# Patient Record
Sex: FEMALE | Race: WHITE | NOT HISPANIC OR LATINO | Employment: PART TIME | URBAN - METROPOLITAN AREA
[De-identification: names, ages, dates, MRNs, and addresses within clinical notes are randomized per-mention and may not be internally consistent; named-entity substitution may affect disease eponyms.]

---

## 2017-03-17 ENCOUNTER — OFFICE VISIT (OUTPATIENT)
Dept: SLEEP MEDICINE | Facility: CLINIC | Age: 57
End: 2017-03-17
Payer: COMMERCIAL

## 2017-03-17 VITALS
HEIGHT: 62 IN | WEIGHT: 200.6 LBS | OXYGEN SATURATION: 97 % | DIASTOLIC BLOOD PRESSURE: 77 MMHG | HEART RATE: 73 BPM | BODY MASS INDEX: 36.91 KG/M2 | SYSTOLIC BLOOD PRESSURE: 127 MMHG

## 2017-03-17 DIAGNOSIS — G47.00 INSOMNIA, UNSPECIFIED TYPE: ICD-10-CM

## 2017-03-17 DIAGNOSIS — E66.01 MORBID OBESITY DUE TO EXCESS CALORIES (H): ICD-10-CM

## 2017-03-17 DIAGNOSIS — R53.82 CHRONIC FATIGUE: ICD-10-CM

## 2017-03-17 DIAGNOSIS — R06.83 SNORING: Primary | ICD-10-CM

## 2017-03-17 DIAGNOSIS — I10 ESSENTIAL HYPERTENSION: ICD-10-CM

## 2017-03-17 PROCEDURE — 99244 OFF/OP CNSLTJ NEW/EST MOD 40: CPT | Performed by: FAMILY MEDICINE

## 2017-03-17 RX ORDER — EPINEPHRINE 0.3 MG/.3ML
0.3 INJECTION SUBCUTANEOUS PRN
COMMUNITY

## 2017-03-17 RX ORDER — ESTRADIOL 2 MG/1
2 TABLET ORAL DAILY
COMMUNITY

## 2017-03-17 RX ORDER — BUSPIRONE HYDROCHLORIDE 15 MG/1
15 TABLET ORAL 3 TIMES DAILY
COMMUNITY
End: 2023-09-11

## 2017-03-17 RX ORDER — HYDROCHLOROTHIAZIDE 25 MG/1
25 TABLET ORAL DAILY
COMMUNITY
End: 2023-09-11

## 2017-03-17 RX ORDER — IBUPROFEN 800 MG/1
600 TABLET, FILM COATED ORAL EVERY 8 HOURS PRN
COMMUNITY
End: 2023-09-11

## 2017-03-17 RX ORDER — LISINOPRIL 20 MG/1
10 TABLET ORAL DAILY
COMMUNITY
End: 2023-09-11

## 2017-03-17 RX ORDER — CYCLOBENZAPRINE HCL 10 MG
10 TABLET ORAL
COMMUNITY
End: 2023-09-11

## 2017-03-17 RX ORDER — ROPINIROLE 0.25 MG/1
0.25 TABLET, FILM COATED ORAL DAILY
COMMUNITY
End: 2023-09-11

## 2017-03-17 RX ORDER — HYDROXYZINE PAMOATE 25 MG/1
25 CAPSULE ORAL 2 TIMES DAILY PRN
COMMUNITY
End: 2023-08-24

## 2017-03-17 NOTE — NURSING NOTE
"Chief Complaint   Patient presents with     Sleep Problem     Consult; was referred by Dr. HORNER at [facility]. C/O edema in legs and did an echo for her heart.        Initial /77  Pulse 73  Ht 1.581 m (5' 2.25\")  Wt 91 kg (200 lb 9.6 oz)  SpO2 97%  BMI 36.4 kg/m2 Estimated body mass index is 36.4 kg/(m^2) as calculated from the following:    Height as of this encounter: 1.581 m (5' 2.25\").    Weight as of this encounter: 91 kg (200 lb 9.6 oz).  Medication Reconciliation: complete   "

## 2017-03-17 NOTE — PATIENT INSTRUCTIONS

## 2017-03-17 NOTE — PROGRESS NOTES
"  Sleep Consultation:    Date on this visit: 3/17/2017    Yumi Huitron is sent by Dr. Vernell Miguel for a sleep consultation regarding probable PETER.    Primary Physician: Iman Mai     CC: \"I was recommended to come.\"    HPI:  Yumi Huitron is a pleasant 57 yo F with history of chronic LE edema, chronic low back pain, MDD, social phobia / panic disorder, HTN, reduced LVEF who presents for evaluation in regards to sleep disordered breathing.  She is alone for this visit today.    She was recommended to be evaluated for PETER following history of snoring and work-up for chronic LE edema and recent TTE performed 3/1/2017 with reduced LVEF of 50-55%, concentric LVH, normal RV function, no valvular concerns, unable to estimate PA pressures.    She note having snoring for many years, but denies any observed apnea.  Will have AM headaches a few times a week.  Occasional choking / gasping arousals.  Notes significant daytime fatigue.  No known family history of PETER.    History of RLS, feels it is well controlled with Ropinirole 0.25mg x1-2 one hour before bed.  Feels insomnia is stable with Trazodone 50-100mg PO QHS.    On work nights, will go to bed around 8pm and take \"a couple\" of hours to fall asleep, during which she does read and watch TV in bed.  Will awaken 2-3x, unclear reasons, may take \"a couple\" of hours to fall back asleep.  Up by alarm at 5am.  Estimates total sleep time at 7 hours.  On weekends, to bed at 8pm and up naturally closer to 8am and estimates total sleep time at 9 hours.  Notes she takes a nap most days in the early afternoon for ~2 hours.    Yumi denies any sleep walking, dream enactment, sleep paralysis, cataplexy and hypnogogic/hypnopompic hallucinations.    Patient's Franklin Sleepiness score 9/24 consistent with no daytime sleepiness.      SHx:  Lives with boyfriend.  Has 3 adult children.  Works on Wed / Thurs for [employer].    Allergies:    Allergies   Allergen Reactions "     Amoxicillin Rash     And blisters on hands       Medications:    Current Outpatient Prescriptions   Medication Sig Dispense Refill     busPIRone (BUSPAR) 15 MG tablet Take 15 mg by mouth 3 times daily       cyclobenzaprine (FLEXERIL) 10 MG tablet Take 10 mg by mouth once as needed for muscle spasms       EPINEPHrine 0.3 MG/0.3ML injection Inject 0.3 mg into the muscle as needed for anaphylaxis       estradiol (ESTRACE) 2 MG tablet Take 2 mg by mouth daily       fluticasone (FLOVENT DISKUS) 50 MCG/BLIST AEPB Inhale 1 puff into the lungs every 12 hours       citalopram (CELEXA) 40 MG tablet Take 40 mg by mouth daily. 1 per day       HYDROmorphone (DILAUDID) 2 MG tablet Take 1 tablet (2 mg) by mouth every 4 hours as needed for moderate to severe pain 8 tablet 0     traZODone (DESYREL) 50 MG tablet Take 50 mg by mouth At Bedtime. 1 hs prn         Problem List:  Patient Active Problem List    Diagnosis Date Noted     S/P hysterectomy 05/21/2012     Priority: Medium     Supracervical hysterectomy and bilateral salpingectomy. Needs pap             History of hysterectomy leaving cervix intact 05/21/2012     Priority: Medium     Psoriasis 05/21/2012     Priority: Medium     Other general medical examination for administrative purposes 05/21/2012     Priority: Medium     ANAI at [place] but regular health care at [facility] - follow up on thyroid nodule there          Past Medical/Surgical History:  History reviewed. No pertinent past medical history.  Past Surgical History   Procedure Laterality Date     Gyn surgery  2003     Supracervical hysterectomy and bilateral salpingectomy.     Cholecystectomy  2006     gallbladder removed       Social History:  Social History     Social History     Marital status: Single     Spouse name: N/A     Number of children: N/A     Years of education: N/A     Occupational History     Not on file.     Social History Main Topics     Smoking status: Never Smoker     Smokeless tobacco: Never  Used     Alcohol use Yes      Comment: Moderate     Drug use: No     Sexual activity: Not Currently     Other Topics Concern     Parent/Sibling W/ Cabg, Mi Or Angioplasty Before 65f 55m? No     Social History Narrative       Family History:  Family History   Problem Relation Age of Onset     DIABETES Paternal Grandmother        Review of Systems:  A complete review of systems reviewed by me is negative with the exeption of what has been mentioned in the history of present illness.  CONSTITUTIONAL:  POSITIVE for  weight gain  EYES: NEGATIVE for changes in vision, blind spots, double vision.  ENT:  POSITIVE for  ear pain  CARDIAC:  POSITIVE for  swollen legs and swollen feet  NEUROLOGIC:  POSITIVE for  headaches and weakness or numbness in the arms or legs  DERMATOLOGIC: NEGATIVE for rashes, new moles or change in mole(s)  PULMONARY:  POSITIVE for  SOB with activity, dry cough and productive cough  GASTROINTESTINAL: NEGATIVE for nausea or vomitting, loose or watery stools, fat or grease in stools, constipation, abdominal pain, bowel movements black in color or blood noted.  GENITOURINARY: NEGATIVE for pain during urination, blood in urine, urinating more frequently than usual, irregular menstrual periods.  MUSCULOSKELETAL:  POSITIVE for  muscle pain and swollen joints  ENDOCRINE: NEGATIVE for increased thirst or urination, diabetes.  LYMPHATIC: NEGATIVE for swollen lymph nodes, lumps or bumps in the breasts or nipple discharge.    Physical Examination:  Vitals: There were no vitals taken for this visit.  BMI= There is no height or weight on file to calculate BMI.         Ulysses Total Score 3/17/2017   Total score - Ulysses 9       GENERAL APPEARANCE: healthy, alert, no distress and over weight  RESP: lungs clear to auscultation - no rales, rhonchi or wheezes  CV: regular rates and rhythm, normal S1 S2, no S3 or S4 and no murmur, click or rub  EXT: 1-2+ pitting edema to mid pre-tibial surface bilaterally  PSYCH:  mentation appears normal and affect normal/bright    Impression/Plan:    Yumi Huitron is a pleasant 55 yo F with history of chronic LE edema, chronic low back pain, MDD, social phobia / panic disorder, HTN, reduced LVEF who presents for evaluation in regards to sleep disordered breathing.    1.)  High probability of PETER.     - STOP-BANG score of 5 with snoring, daytime fatigue, HTN, BMI > 35, age > 50.  Also recent TTE with reduced LVEF, concentrilc LVH.   - Will schedule sleep study to fully evaluate for PETER and possible treatment.  Will order multiple sleep latency test the next day if no significant apnea found during sleep study to evaluate for daytime hypersomnia and narcolepsy.  Will see patient after the study.    2.)  RLS    - Well controlled per patient on Ropinirole 0.25mg x1-2 one hour before bed   - If not checked previously, recommend checking baseline ferritin.  If < 50-60 ng/mL, consider iron replacement.    3.)  Chronic insomnia   - Appears stable on Trazodone 50-100mg PO QHS   - Potential co-morbid PETER   - Also likely multi-factorial with prolonged daily naps, excessive time in bed, to bed excessively early    Plan as given to patient as above.    I have spent 60 minutes with this patient today in which greater than 50% of this time was spent in the counseling / coordination of care regarding PETER, insomnia, RLS.    Polysomnography reviewed.  Obstructive sleep apnea reviewed.  Complications of untreated sleep apnea were reviewed.    Richard Martinez MD    CC: Dr. Vernell Miguel

## 2017-03-17 NOTE — MR AVS SNAPSHOT
After Visit Summary   3/17/2017    Yumi Huitron    MRN: [id number]           Patient Information     Date Of Birth          1960        Visit Information        Provider Department      3/17/2017 1:00 PM Richard Martinez MD [facility]        Today's Diagnoses     Snoring    -  1    Morbid obesity due to excess calories (H)        Essential hypertension        Chronic fatigue        Insomnia, unspecified type          Care Instructions      Your BMI is Body mass index is 36.4 kg/(m^2).  Weight management is a personal decision.  If you are interested in exploring weight loss strategies, the following discussion covers the approaches that may be successful. Body mass index (BMI) is one way to tell whether you are at a healthy weight, overweight, or obese. It measures your weight in relation to your height.  A BMI of 18.5 to 24.9 is in the healthy range. A person with a BMI of 25 to 29.9 is considered overweight, and someone with a BMI of 30 or greater is considered obese. More than two-thirds of American adults are considered overweight or obese.  Being overweight or obese increases the risk for further weight gain. Excess weight may lead to heart disease and diabetes.  Creating and following plans for healthy eating and physical activity may help you improve your health.  Weight control is part of healthy lifestyle and includes exercise, emotional health, and healthy eating habits. Careful eating habits lifelong are the mainstay of weight control. Though there are significant health benefits from weight loss, long-term weight loss with diet alone may be very difficult to achieve- studies show long-term success with dietary management in less than 10% of people. Attaining a healthy weight may be especially difficult to achieve in those with severe obesity. In some cases, medications, devices and surgical management might be considered.  What can you do?  If you are  overweight or obese and are interested in methods for weight loss, you should discuss this with your provider.     Consider reducing daily calorie intake by 500 calories.     Keep a food journal.     Avoiding skipping meals, consider cutting portions instead.    Diet combined with exercise helps maintain muscle while optimizing fat loss. Strength training is particularly important for building and maintaining muscle mass. Exercise helps reduce stress, increase energy, and improves fitness. Increasing exercise without diet control, however, may not burn enough calories to loose weight.       Start walking three days a week 10-20 minutes at a time    Work towards walking thirty minutes five days a week     Eventually, increase the speed of your walking for 1-2 minutes at time    In addition, we recommend that you review healthy lifestyles and methods for weight loss available through the National Institutes of Health patient information sites:  http://win.niddk.nih.gov/publications/index.htm    And look into health and wellness programs that may be available through your health insurance provider, employer, local community center, or justin club.    Weight management plan: Patient was referred to their PCP to discuss a diet and exercise plan.           Follow-ups after your visit        Follow-up notes from your care team     Return if symptoms worsen or fail to improve.      Your next 10 appointments already scheduled     Mar 27, 2017  8:00 PM CDT   PSG Split with SLEEP LAB, BED FOUR   [facility]    [address]  [facility] [address]   [phone]            Mar 28, 2017  8:00 AM CDT   Return Sleep Patient with Richard Martinez MD   [facility] ([facility]    [address]  [facility] [address]   [phone]              Future tests that were ordered for you today     Open Future Orders        Priority Expected  "Expires Ordered    Comprehensive Sleep Study Routine  2017 3/17/2017            Who to contact     If you have questions or need follow up information about today's clinic visit or your schedule please contact [facility] directly at [phone].  Normal or non-critical lab and imaging results will be communicated to you by MyChart, letter or phone within 4 business days after the clinic has received the results. If you do not hear from us within 7 days, please contact the clinic through MyChart or phone. If you have a critical or abnormal lab result, we will notify you by phone as soon as possible.  Submit refill requests through [employer] or call your pharmacy and they will forward the refill request to us. Please allow 3 business days for your refill to be completed.          Additional Information About Your Visit        MyChart Information     [employer] lets you send messages to your doctor, view your test results, renew your prescriptions, schedule appointments and more. To sign up, go to [email or web address] . Click on \"Log in\" on the left side of the screen, which will take you to the Welcome page. Then click on \"Sign up Now\" on the right side of the page.     You will be asked to enter the access code listed below, as well as some personal information. Please follow the directions to create your username and password.     Your access code is: [email or web address]  Expires: 6/15/2017  1:53 PM     Your access code will  in 90 days. If you need help or a new code, please call your [facility] or [phone].        Care EveryWhere ID     This is your Care EveryWhere ID. This could be used by other organizations to access your [place] medical records  [id number]        Your Vitals Were     Pulse Height Pulse Oximetry BMI (Body Mass Index)          73 1.581 m (5' 2.25\") 97% 36.4 kg/m2         Blood Pressure from Last 3 Encounters:   17 127/77   01/28/15 115/79   14 128/61 "    Weight from Last 3 Encounters:   03/17/17 91 kg (200 lb 9.6 oz)   06/04/12 89.8 kg (198 lb)   05/21/12 91.4 kg (201 lb 9.6 oz)                 Today's Medication Changes          These changes are accurate as of: 3/17/17  1:53 PM.  If you have any questions, ask your nurse or doctor.               These medicines have changed or have updated prescriptions.        Dose/Directions    busPIRone 15 MG tablet   Commonly known as:  BUSPAR   This may have changed:  Another medication with the same name was removed. Continue taking this medication, and follow the directions you see here.   Changed by:  Richard Martinez MD        Dose:  15 mg   Take 15 mg by mouth 3 times daily   Refills:  0                Primary Care Provider Office Phone # Fax #    Iman Mai [phone]       [facility] [address] [facility] [address]        Thank you!     Thank you for choosing [facility]  for your care. Our goal is always to provide you with excellent care. Hearing back from our patients is one way we can continue to improve our services. Please take a few minutes to complete the written survey that you may receive in the mail after your visit with us. Thank you!             Your Updated Medication List - Protect others around you: Learn how to safely use, store and throw away your medicines at www.disposemymeds.org.          This list is accurate as of: 3/17/17  1:53 PM.  Always use your most recent med list.                   Brand Name Dispense Instructions for use    busPIRone 15 MG tablet    BUSPAR     Take 15 mg by mouth 3 times daily       celeXA 40 MG tablet   Generic drug:  citalopram      Take 40 mg by mouth daily. 1 per day       cyclobenzaprine 10 MG tablet    FLEXERIL     Take 10 mg by mouth once as needed for muscle spasms       EPINEPHrine 0.3 MG/0.3ML injection      Inject 0.3 mg into the muscle as needed for anaphylaxis       estradiol 2 MG tablet     ESTRACE     Take 2 mg by mouth daily       fluticasone 50 MCG/BLIST Aepb    FLOVENT DISKUS     Inhale 1 puff into the lungs every 12 hours       hydrochlorothiazide 25 MG tablet    HYDRODIURIL     Take 25 mg by mouth daily       HYDROmorphone 2 MG tablet    DILAUDID    8 tablet    Take 1 tablet (2 mg) by mouth every 4 hours as needed for moderate to severe pain       hydrOXYzine 25 MG capsule    VISTARIL     Take 25 mg by mouth 2 times daily as needed for itching       ibuprofen 800 MG tablet    ADVIL/MOTRIN     Take 800 mg by mouth every 8 hours as needed for moderate pain       lisinopril 20 MG tablet    PRINIVIL/ZESTRIL     Take 20 mg by mouth daily       rOPINIRole 0.25 MG tablet    REQUIP     Take 0.25 mg by mouth daily       traZODone 50 MG tablet    DESYREL     Take 50 mg by mouth At Bedtime. 1 hs prn

## 2017-03-27 ENCOUNTER — THERAPY VISIT (OUTPATIENT)
Dept: SLEEP MEDICINE | Facility: CLINIC | Age: 57
End: 2017-03-27
Payer: COMMERCIAL

## 2017-03-27 DIAGNOSIS — E66.01 MORBID OBESITY DUE TO EXCESS CALORIES (H): ICD-10-CM

## 2017-03-27 DIAGNOSIS — I10 ESSENTIAL HYPERTENSION: ICD-10-CM

## 2017-03-27 DIAGNOSIS — R06.83 SNORING: ICD-10-CM

## 2017-03-27 DIAGNOSIS — G47.00 INSOMNIA, UNSPECIFIED TYPE: ICD-10-CM

## 2017-03-27 DIAGNOSIS — R53.82 CHRONIC FATIGUE: ICD-10-CM

## 2017-03-27 PROCEDURE — 95810 POLYSOM 6/> YRS 4/> PARAM: CPT | Performed by: FAMILY MEDICINE

## 2017-03-27 NOTE — MR AVS SNAPSHOT
After Visit Summary   3/27/2017    Yumi Huitron    MRN: [id number]           Patient Information     Date Of Birth          1960        Visit Information        Provider Department      3/27/2017 8:00 PM SLEEP LAB, BED FOUR [facility]        Today's Diagnoses     Insomnia, unspecified type        Chronic fatigue        Essential hypertension        Morbid obesity due to excess calories (H)        Snoring          Care Instructions    Pt arrived at [facility] Sleep lab for sleep study.  Diagnostic PSG completed per provider order.  Patient did not meet criteria for PAP therapy.  MEDICATIONS: busPIRone (BUSPAR) 15 MG tablet      cyclobenzaprine (FLEXERIL) 10 MG tablet     EPINEPHrine 0.3 MG/0.3ML injection    estradiol (ESTRACE) 2 MG tablet    fluticasone (FLOVENT DISKUS) 50 MCG/BLIST AEPB    hydrochlorothiazide (HYDRODIURIL) 25 MG tablet     hydrOXYzine (VISTARIL) 25 MG capsule     ibuprofen (ADVIL/MOTRIN) 800 MG tablet    lisinopril (PRINIVIL/ZESTRIL) 20 MG tablet    rOPINIRole (REQUIP) 0.25 MG tablet     HYDROmorphone (DILAUDID) 2 MG tablet     citalopram (CELEXA) 40 MG tablet     traZODone (DESYREL) 50 MG tablet       STUDY TYPE: NPSG  SLEEP AID: Trazadone 50mg 2 hours prior to lights out  PAP ACCLIMATION: good, trialed with the wisp large, airfit f-20 small and the dreamwear small.  RESPIRATORY EVENTS: obstructive hypopneas (rem supine) and post arousal centrals resulting in an AHI <10  ECG Baseline Heart Rate= 65 BPM/check #780 questionable bigemeny  SPO2-Baseline=96% with Harvey=84%   SNORING: no  TCO2 MONITORING: n/a          Follow-ups after your visit        Who to contact     If you have questions or need follow up information about today's clinic visit or your schedule please contact [facility] directly at [phone].  Normal or non-critical lab and imaging results will be communicated to you by MyChart, letter or phone within 4 business days after  "the clinic has received the results. If you do not hear from us within 7 days, please contact the clinic through [employer] or phone. If you have a critical or abnormal lab result, we will notify you by phone as soon as possible.  Submit refill requests through [employer] or call your pharmacy and they will forward the refill request to us. Please allow 3 business days for your refill to be completed.          Additional Information About Your Visit        [employer] Information     [employer] lets you send messages to your doctor, view your test results, renew your prescriptions, schedule appointments and more. To sign up, go to [email or web address] . Click on \"Log in\" on the left side of the screen, which will take you to the Welcome page. Then click on \"Sign up Now\" on the right side of the page.     You will be asked to enter the access code listed below, as well as some personal information. Please follow the directions to create your username and password.     Your access code is: [email or web address]  Expires: 6/15/2017  1:53 PM     Your access code will  in 90 days. If you need help or a new code, please call your [place] clinic or [phone].        Care EveryWhere ID     This is your Care EveryWhere ID. This could be used by other organizations to access your [place] medical records  [id number]         Blood Pressure from Last 3 Encounters:   17 132/71   17 127/77   01/28/15 115/79    Weight from Last 3 Encounters:   17 91 kg (200 lb 9.6 oz)   17 91 kg (200 lb 9.6 oz)   12 89.8 kg (198 lb)              We Performed the Following     Comprehensive Sleep Study        Primary Care Provider Office Phone # Fax #    Iman Mai [phone]       [facility] [address] [facility] [address]        Thank you!     Thank you for choosing [facility]  for your care. Our goal is always to provide you with excellent care. Hearing back from " our patients is one way we can continue to improve our services. Please take a few minutes to complete the written survey that you may receive in the mail after your visit with us. Thank you!             Your Updated Medication List - Protect others around you: Learn how to safely use, store and throw away your medicines at www.disposemymeds.org.          This list is accurate as of: 3/27/17 11:59 PM.  Always use your most recent med list.                   Brand Name Dispense Instructions for use    busPIRone 15 MG tablet    BUSPAR     Take 15 mg by mouth 3 times daily       celeXA 40 MG tablet   Generic drug:  citalopram      Take 40 mg by mouth daily. 1 per day       cyclobenzaprine 10 MG tablet    FLEXERIL     Take 10 mg by mouth once as needed for muscle spasms       EPINEPHrine 0.3 MG/0.3ML injection      Inject 0.3 mg into the muscle as needed for anaphylaxis       estradiol 2 MG tablet    ESTRACE     Take 2 mg by mouth daily       fluticasone 50 MCG/BLIST Aepb    FLOVENT DISKUS     Inhale 1 puff into the lungs every 12 hours       hydrochlorothiazide 25 MG tablet    HYDRODIURIL     Take 25 mg by mouth daily       HYDROmorphone 2 MG tablet    DILAUDID    8 tablet    Take 1 tablet (2 mg) by mouth every 4 hours as needed for moderate to severe pain       hydrOXYzine 25 MG capsule    VISTARIL     Take 25 mg by mouth 2 times daily as needed for itching       ibuprofen 800 MG tablet    ADVIL/MOTRIN     Take 800 mg by mouth every 8 hours as needed for moderate pain       lisinopril 20 MG tablet    PRINIVIL/ZESTRIL     Take 20 mg by mouth daily       rOPINIRole 0.25 MG tablet    REQUIP     Take 0.25 mg by mouth daily       traZODone 50 MG tablet    DESYREL     Take 50 mg by mouth At Bedtime. 1 hs prn

## 2017-03-28 ENCOUNTER — OFFICE VISIT (OUTPATIENT)
Dept: SLEEP MEDICINE | Facility: CLINIC | Age: 57
End: 2017-03-28
Payer: COMMERCIAL

## 2017-03-28 VITALS
OXYGEN SATURATION: 94 % | HEIGHT: 62 IN | SYSTOLIC BLOOD PRESSURE: 132 MMHG | BODY MASS INDEX: 36.91 KG/M2 | WEIGHT: 200.6 LBS | DIASTOLIC BLOOD PRESSURE: 71 MMHG | HEART RATE: 69 BPM

## 2017-03-28 DIAGNOSIS — G47.33 OSA (OBSTRUCTIVE SLEEP APNEA): ICD-10-CM

## 2017-03-28 DIAGNOSIS — G47.33 OSA (OBSTRUCTIVE SLEEP APNEA): Primary | ICD-10-CM

## 2017-03-28 PROCEDURE — 99214 OFFICE O/P EST MOD 30 MIN: CPT | Performed by: FAMILY MEDICINE

## 2017-03-28 NOTE — PATIENT INSTRUCTIONS
Pt arrived at [facility] Sleep lab for sleep study.  Diagnostic PSG completed per provider order.  Patient did not meet criteria for PAP therapy.  MEDICATIONS: busPIRone (BUSPAR) 15 MG tablet      cyclobenzaprine (FLEXERIL) 10 MG tablet     EPINEPHrine 0.3 MG/0.3ML injection    estradiol (ESTRACE) 2 MG tablet    fluticasone (FLOVENT DISKUS) 50 MCG/BLIST AEPB    hydrochlorothiazide (HYDRODIURIL) 25 MG tablet     hydrOXYzine (VISTARIL) 25 MG capsule     ibuprofen (ADVIL/MOTRIN) 800 MG tablet    lisinopril (PRINIVIL/ZESTRIL) 20 MG tablet    rOPINIRole (REQUIP) 0.25 MG tablet     HYDROmorphone (DILAUDID) 2 MG tablet     citalopram (CELEXA) 40 MG tablet     traZODone (DESYREL) 50 MG tablet       STUDY TYPE: NPSG  SLEEP AID: Trazadone 50mg 2 hours prior to lights out  PAP ACCLIMATION: good, trialed with the wisp large, airfit f-20 small and the dreamwear small.  RESPIRATORY EVENTS: obstructive hypopneas (rem supine) and post arousal centrals resulting in an AHI <10  ECG Baseline Heart Rate= 65 BPM/check #780 questionable bigemeny  SPO2-Baseline=96% with Harvey=84%   SNORING: no  TCO2 MONITORING: n/a

## 2017-03-28 NOTE — NURSING NOTE
"Chief Complaint   Patient presents with     Sleep Problem     Discuss sleep study       Initial /71  Pulse 69  Ht 1.581 m (5' 2.24\")  Wt 91 kg (200 lb 9.6 oz)  SpO2 94%  BMI 36.4 kg/m2 Estimated body mass index is 36.4 kg/(m^2) as calculated from the following:    Height as of this encounter: 1.581 m (5' 2.24\").    Weight as of this encounter: 91 kg (200 lb 9.6 oz).  Medication Reconciliation: complete  "

## 2017-03-28 NOTE — MR AVS SNAPSHOT
After Visit Summary   3/28/2017    Yumi Huitron    MRN: [id number]           Patient Information     Date Of Birth          1960        Visit Information        Provider Department      3/28/2017 8:00 AM Richard Martinez MD [facility]        Today's Diagnoses     PETER (obstructive sleep apnea)          Care Instructions      Your BMI is Body mass index is 36.4 kg/(m^2).  Weight management is a personal decision.  If you are interested in exploring weight loss strategies, the following discussion covers the approaches that may be successful. Body mass index (BMI) is one way to tell whether you are at a healthy weight, overweight, or obese. It measures your weight in relation to your height.  A BMI of 18.5 to 24.9 is in the healthy range. A person with a BMI of 25 to 29.9 is considered overweight, and someone with a BMI of 30 or greater is considered obese. More than two-thirds of American adults are considered overweight or obese.  Being overweight or obese increases the risk for further weight gain. Excess weight may lead to heart disease and diabetes.  Creating and following plans for healthy eating and physical activity may help you improve your health.  Weight control is part of healthy lifestyle and includes exercise, emotional health, and healthy eating habits. Careful eating habits lifelong are the mainstay of weight control. Though there are significant health benefits from weight loss, long-term weight loss with diet alone may be very difficult to achieve- studies show long-term success with dietary management in less than 10% of people. Attaining a healthy weight may be especially difficult to achieve in those with severe obesity. In some cases, medications, devices and surgical management might be considered.  What can you do?  If you are overweight or obese and are interested in methods for weight loss, you should discuss this with your provider.     Consider  reducing daily calorie intake by 500 calories.     Keep a food journal.     Avoiding skipping meals, consider cutting portions instead.    Diet combined with exercise helps maintain muscle while optimizing fat loss. Strength training is particularly important for building and maintaining muscle mass. Exercise helps reduce stress, increase energy, and improves fitness. Increasing exercise without diet control, however, may not burn enough calories to loose weight.       Start walking three days a week 10-20 minutes at a time    Work towards walking thirty minutes five days a week     Eventually, increase the speed of your walking for 1-2 minutes at time    In addition, we recommend that you review healthy lifestyles and methods for weight loss available through the National Institutes of Health patient information sites:  http://win.niddk.nih.gov/publications/index.htm    And look into health and wellness programs that may be available through your health insurance provider, employer, local community center, or justin club.    Weight management plan: Patient was referred to their PCP to discuss a diet and exercise plan.          Follow-ups after your visit        Who to contact     If you have questions or need follow up information about today's clinic visit or your schedule please contact [facility] directly at [phone].  Normal or non-critical lab and imaging results will be communicated to you by MyChart, letter or phone within 4 business days after the clinic has received the results. If you do not hear from us within 7 days, please contact the clinic through [employer] or phone. If you have a critical or abnormal lab result, we will notify you by phone as soon as possible.  Submit refill requests through [employer] or call your pharmacy and they will forward the refill request to us. Please allow 3 business days for your refill to be completed.          Additional Information About Your Visit    "     MyChart Information     [facility] lets you send messages to your doctor, view your test results, renew your prescriptions, schedule appointments and more. To sign up, go to [email or web address] . Click on \"Log in\" on the left side of the screen, which will take you to the Welcome page. Then click on \"Sign up Now\" on the right side of the page.     You will be asked to enter the access code listed below, as well as some personal information. Please follow the directions to create your username and password.     Your access code is: [email or web address]  Expires: 6/15/2017  1:53 PM     Your access code will  in 90 days. If you need help or a new code, please call your [facility] clinic or [phone].        Care EveryWhere ID     This is your Care EveryWhere ID. This could be used by other organizations to access your [facility] medical records  [id number]        Your Vitals Were     Pulse Height Pulse Oximetry BMI (Body Mass Index)          69 1.581 m (5' 2.24\") 94% 36.4 kg/m2         Blood Pressure from Last 3 Encounters:   17 132/71   17 127/77   01/28/15 115/79    Weight from Last 3 Encounters:   17 91 kg (200 lb 9.6 oz)   17 91 kg (200 lb 9.6 oz)   12 89.8 kg (198 lb)              Today, you had the following     No orders found for display       Primary Care Provider Office Phone # Fax #    Iman Mai [phone]       [facility] [address] [facility] [facility] [address]        Thank you!     Thank you for choosing [facility]  for your care. Our goal is always to provide you with excellent care. Hearing back from our patients is one way we can continue to improve our services. Please take a few minutes to complete the written survey that you may receive in the mail after your visit with us. Thank you!             Your Updated Medication List - Protect others around you: Learn how to safely use, store and throw away your medicines " at www.disposemymeds.org.          This list is accurate as of: 3/28/17  8:51 AM.  Always use your most recent med list.                   Brand Name Dispense Instructions for use    busPIRone 15 MG tablet    BUSPAR     Take 15 mg by mouth 3 times daily       celeXA 40 MG tablet   Generic drug:  citalopram      Take 40 mg by mouth daily. 1 per day       cyclobenzaprine 10 MG tablet    FLEXERIL     Take 10 mg by mouth once as needed for muscle spasms       EPINEPHrine 0.3 MG/0.3ML injection      Inject 0.3 mg into the muscle as needed for anaphylaxis       estradiol 2 MG tablet    ESTRACE     Take 2 mg by mouth daily       fluticasone 50 MCG/BLIST Aepb    FLOVENT DISKUS     Inhale 1 puff into the lungs every 12 hours       hydrochlorothiazide 25 MG tablet    HYDRODIURIL     Take 25 mg by mouth daily       HYDROmorphone 2 MG tablet    DILAUDID    8 tablet    Take 1 tablet (2 mg) by mouth every 4 hours as needed for moderate to severe pain       hydrOXYzine 25 MG capsule    VISTARIL     Take 25 mg by mouth 2 times daily as needed for itching       ibuprofen 800 MG tablet    ADVIL/MOTRIN     Take 800 mg by mouth every 8 hours as needed for moderate pain       lisinopril 20 MG tablet    PRINIVIL/ZESTRIL     Take 20 mg by mouth daily       rOPINIRole 0.25 MG tablet    REQUIP     Take 0.25 mg by mouth daily       traZODone 50 MG tablet    DESYREL     Take 50 mg by mouth At Bedtime. 1 hs prn

## 2017-03-28 NOTE — PROCEDURES
" SLEEP STUDY INTERPRETATION  POLYSOMNOGRAPHY REPORT      Patient: CINTHYA GARCIA  Date of Birth: [birth year]  Study Date: 3/27/17  MRN: [id number]  Referring Provider: Vernell Pablo MD  Ordering Provider: Richard Martinez MD    Indications for Polysomnography: The patient is a 56 y old Female who is 5' 2\" and weighs 200.0 lbs.  Her BMI is 36.3, [place] sleepiness scale 9.0 and neck size is 0.0.  Relevant medical history includes chronic LE edema, chronic low back pain, MDD, social phobia / panic disorder, HTN, reduced LVEF. A diagnostic polysomnogram was performed to evaluate for snoring, insomnia, chronic LE edema, mild reduction in LVEF on TTE performed 3/1/2017.    Polysomnogram Data:  A full night polysomnogram recorded the standard physiologic parameters including EEG, EOG, EMG, ECG, nasal and oral airflow.  Respiratory parameters of chest and abdominal movements were recorded with respiratory inductance plethysmography.  Oxygen saturation was recorded by pulse oximetry.      Sleep Architecture: Short sleep latency, delayed REM latency, excellent sleep efficiency  The total recording time of the polysomnogram was 484.7 minutes.  The total sleep time was 471.0 minutes.  Sleep latency was decreased at 3.4 minutes with Trazodone 50mg two hours prior to lights out.  REM latency was 349.5 minutes.  Arousal index was increased at 37.1 arousals per hour.  Sleep efficiency was excellent at 97.2%.  Wake after sleep onset was 9.5 minutes.  The patient spent 7.1% of total sleep time in Stage N1, 76.9% in Stage N2, 7.6% in Stages N3, and 8.4% in REM.  Time in REM supine was 39.5 minutes.    Respiration: Mild PETER without sleep-associated hypoxemia.    Events - The polysomnogram revealed a presence of - obstructive, 13 central, and - mixed apneas resulting in an apnea index of 1.7 events per hour.  There were 34 hypopneas resulting in a hypopnea index of 4.3 events per hour.  The combined apnea/hypopnea index was 6.0 " events per hour.  The REM AHI was 28.9 events per hour.  The supine AHI was 6.0 events per hour.  The RERA index was 3.7 events per hour.   The RDI was 9.7 events per hour.    Snoring - was reported as minimal.    Respiratory rate and pattern - was notable for normal respiratory rate and pattern.    Sustained Sleep Associated Hypoventilation - Transcutaneous carbon dioxide monitoring was not used, however significant hypoventilation was not suggested by oximetry.    Sleep Associated Hypoxemia - (Greater than 5 minutes O2 sat below 89%) was not present.  Baseline oxygen saturation was 93.6%. Lowest oxygen saturation was 83.8%.  Time spent less than or equal to 88% was 0.6 minutes.  Time spent less than or equal to 89% was 0.7 minutes.  9.7 3.7 6.0     Movement Activity: Nothing of note    Periodic Limb Activity - There were - PLMs during the entire study. The PLM index was - movements per hour.  The PLM Arousal Index was - per hour.    REM EMG Activity - Excessive transient / sustained muscle activity was not present.    Nocturnal Behavior - Abnormal sleep related behaviors were not noted during / arising out of NREM / REM sleep.    Bruxism - None apparent.    Cardiac Summary:   The average pulse rate was 65.6 bpm.  The minimum pulse rate was 45.8 bpm while the maximum pulse rate was 86.2 bpm. The rhythm is normal sinus. Arrhythmias were not noted.    Assessment:     Sleep architecture noted for short sleep latency (sleep onset ~10pm), delayed REM latency, excellent sleep efficiency    Mild PETER without sleep-associated hypoxemia    Recommendations:    Based on the presence of mild obstructive sleep apnea and excessive daytime sleepiness, treatment could be empirically initiated with Auto-titrating PAP therapy with a range of 5 - 15 cmH2O. Recommend clinical follow up with sleep management team.    Patient may be a candidate for dental appliance through referral to Sleep Dentistry for the treatment of obstructive  "sleep apnea and/or socially disruptive snoring.    Weight management (if BMI > 30).    Diagnostic Codes:     Obstructive Sleep Apnea G47.33                     Range(%) Time in range (min) Time in range (%) Time in or below range (min) Time in or below range (%)   0.0 - 89.0 0.7 0.2% 0.7 0.2%   0.0 - 88.0 0.6 0.1% 0.6 0.1%      6.0 6.0 28.9         PSG REPORT      Patient Name: CINTHYA GARCIA Study Date: 3/27/17   Date of Birth: [birth year] Study Type: NPSG   Age:  56 y MRN: [id number]   Sex: Female Interpreting Provider: jake   BMI:  36.3 Ordering Provider: Richard Martinez MD   Height: 5' 2\" Referring Provider: Vernell Pablo MD   Weight: 200.0 lbs Recording Tech: Vladislav Weinstein   [place]: 9.0 Scoring Tech: LUKASZ Hopper   Neck Size (cm): 0.0 Hypopnea Scoring 4%       Study Overview    First Lights Off: 10:06:35 PM  COUNT INDEX   Last Lights On: 06:11:17 AM Awakenings: 18 2.3   Time in Bed: 484.7 min Arousals: 291 37.1   Total Sleep Time: 471.0 min Apneas & Hypopneas: 47 6.0   Sleep Efficiency: 97.2% Limb Movements: - -   Sleep Period Time: 481.0 min RDI:  9.7   Sleep Latency: 3.4 min   3.7   Wake after Sleep Onset: 9.5 min      REM Latency from Sleep Onset: 349.5 min Average Oxygen Saturation:  93.6%        Sleep Architecture                   % of Time in Bed  Stages TIME (mins) % SLEEP TIME   WAKE 14.0    Stage N1 33.5 7.1%   Stage N2 362.0 76.9%   Stage N3 36.0 7.6%   REM 39.5 8.4%     Arousal Summary     NREM REM Total Sleep Time Index   Apnea & Hypopnea Arousals 4 - 4 0.5   PLM Arousals - - - -   Isolated Limb Movement Arousals - - - -   Spontaneous Arousals 242 3 245 32.9   RERAs  25 4 29 3.7   Total 271 7 278 37.1   Arousal Index 37.7 10.6 35.4        Respiratory Summary     By Sleep Stage By Body Position TOTAL    NREM REM REM SUPINE SUPINE NON-SUPINE    Sleep Time (min) 431.5 39.5 39.5 471.0 - 471.0   Mixed Apnea Index - - - - - -   Obstructive Apnea - - - - - -    Mixed Apnea - - - - - - "   Central Apnea 9 4 4 13 - 13   Total Apneas 9 4 4 13 - 13   Total Apnea Index 1.3 6.1 6.1 1.7 - 1.7   Central Apnea Index 1.3 6.1 6.1 1.7 - 1.7   Obstructive Hypopnea  19 15 15 34 - 34   Central Hypopnea - - - - - -   Total Hypopneas 19 15 15 34 - 34   Total Hypopnea Index 2.6 22.8 22.8 4.3 - 4.3   Central Hypopnea Index - - - - - -   All Apneas & Hypopneas 28 19 19 47 - 47   CAHI 1.3 6.1 6.1 1.7 - 1.7   CAHI% 33% 21% 21% 28% - 28%   AHI 3.9 28.9 28.9 6.0 - 6.0             RERAs 25 4 4 29 - 29   RERA Index 3.5 6.1 6.1 3.7 - 3.7   RDI 7.4 35.0 35.0 9.7 - 9.7     Oxygen Saturation Summary     WAKE NREM REM   Average OSat (%) 93.6% 93.6% 93.6%   Minimum OSat (%) 85.2% 87.4% 83.8%   Maximum OSat (%) 96.9% 96.5% 95.6%   Oxygen Saturation Durations    Range(%) Time in range (min) Time in range (%)   0.0 - 88.0 0.6 0.1%   0.0 - 89.0 0.7 0.2%     # of Desaturations 27     Minimum Oxygen Saturation During Desaturation 83.8%?       Limb Movement Summary     COUNT INDEX   Isolated Limb Movements - -   Periodic Limb Movements (PLMs) - -   PLM Arousals - -   Total Limb Movements - -     Cardiac Summary     WAKE NREM REM TOTAL    Average Pulse Rate (BPM) 69.8 65.6 64.6 65.6   Minimum Pulse Rate (BPM) 55.4 45.8 50.1 45.8   Maximum Pulse Rate (BPM) 86.2 86.0 84.1 86.2     Cardiac Comments    Not a Normal Sinus Rhythm    Recording Tech Comments    MEDICATIONS: busPIRone (BUSPAR) 15 MG tablet      cyclobenzaprine (FLEXERIL) 10 MG tablet     EPINEPHrine 0.3 MG/0.3ML injection    estradiol (ESTRACE) 2 MG tablet    fluticasone (FLOVENT DISKUS) 50 MCG/BLIST AEPB    hydrochlorothiazide (HYDRODIURIL) 25 MG tablet     hydrOXYzine (VISTARIL) 25 MG capsule     ibuprofen (ADVIL/MOTRIN) 800 MG tablet    lisinopril (PRINIVIL/ZESTRIL) 20 MG tablet    rOPINIRole (REQUIP) 0.25 MG tablet     HYDROmorphone (DILAUDID) 2 MG tablet     citalopram (CELEXA) 40 MG tablet     traZODone (DESYREL) 50 MG tablet       STUDY TYPE: NPSG  SLEEP AID: Trazadone  50mg 2 hours prior to lights out  PAP ACCLIMATION: good, trialed with the wisp large, airfit f-20 small and the dreamwear small.  RESPIRATORY EVENTS: obstructive hypopneas (rem supine) and post arousal centrals resulting in an AHI <10  ECG Baseline Heart Rate= 65 BPM/check #780 questionable bigemeny  SPO2-Baseline=96% with Harvey=84%   SNORING: no  TCO2 MONITORING: n/a      Scoring Tech Comments    This was an all-night diagnostic study.  Sleep included all stages supine only.  There were a few obstructive hypopneas and RERAs with O2 saturations mostly stable in the mid 90%s.  No PLMs.  Normal sinus rhythm with occasional PACs.

## 2017-03-28 NOTE — PROGRESS NOTES
Sleep Study Follow-Up Visit:    Date on this visit: 3/28/2017    Yumi Huitron comes in today for follow-up of her sleep study done on 3/27/2017 at the [facility] [place] for snoring, insomnia, chronic LE edema, mild reduction in LVEF on TTE performed 3/1/2017.    Patient being seen morning after study, so some detailed information has not been generated by computer system, but I reviewed the study in its entirety.    AHI ~10, maria teresa SpO2 84%, time of SpO2 <= 88% of 0.6 minutes.  Multiple supine REM periods observed.  Noted that sleep onset was fast around 10:05pm, no frequent or prolonged awakenings, and awoke naturally at ~6:10am.  She notes feeling like she slept very well.  Noted delayed REM latency.    Past medical/surgical history, family history, social history, medications and allergies were reviewed.      Problem List:  Patient Active Problem List    Diagnosis Date Noted     S/P hysterectomy 05/21/2012     Priority: Medium     Supracervical hysterectomy and bilateral salpingectomy. Needs pap             History of hysterectomy leaving cervix intact 05/21/2012     Priority: Medium     Psoriasis 05/21/2012     Priority: Medium     Other general medical examination for administrative purposes 05/21/2012     Priority: Medium     [facility] [place] but [facility] care at [place] - follow up on thyroid nodule there          Impression/Plan:    1.)  Mild PETER without sleep-associated hypoxemia   - Not felt to have a significant increase in cardiovascular risk factors and I also feel it is unlikely to be a primary component of her chronic LE edema.   - Given improvement in insomnia with behavior modification, would not see strong indication for treatment at this time    2.)  RLS  - Well controlled per patient on Ropinirole 0.25mg x1-2 one hour before bed  - If not checked previously, recommend checking baseline ferritin. If < 50-60 ng/mL, consider iron replacement.    3.)  Chronic insomnia   - Stable  on Trazodone 50-100mg PO QHS   - Noted improved in both sleep latency and maintenance with delay in bed time and minimizing daytime napping   - Reviewed sleep hygiene / stimulus control / sleep restriction    She will follow up with me as needed.     Twenty-five minutes spent with patient, all of which were spent face-to-face counseling, consulting, coordinating plan of care.      Richard Martinez MD    CC: Iman Mai

## 2017-03-28 NOTE — PATIENT INSTRUCTIONS

## 2017-06-05 ENCOUNTER — TELEPHONE (OUTPATIENT)
Dept: SLEEP MEDICINE | Facility: CLINIC | Age: 57
End: 2017-06-05

## 2017-07-08 ENCOUNTER — HEALTH MAINTENANCE LETTER (OUTPATIENT)
Age: 57
End: 2017-07-08

## 2018-07-15 ENCOUNTER — HEALTH MAINTENANCE LETTER (OUTPATIENT)
Age: 58
End: 2018-07-15

## 2020-11-02 ENCOUNTER — RECORDS - HEALTHEAST (OUTPATIENT)
Dept: LAB | Facility: CLINIC | Age: 60
End: 2020-11-02

## 2020-11-02 LAB
SARS-COV-2 PCR COMMENT: NORMAL
SARS-COV-2 RNA SPEC QL NAA+PROBE: NEGATIVE
SARS-COV-2 VIRUS SPECIMEN SOURCE: NORMAL

## 2021-01-15 ENCOUNTER — HOSPITAL ENCOUNTER (EMERGENCY)
Facility: CLINIC | Age: 61
Discharge: HOME | End: 2021-01-15
Attending: FAMILY MEDICINE | Admitting: FAMILY MEDICINE
Payer: COMMERCIAL

## 2021-01-15 ENCOUNTER — APPOINTMENT (OUTPATIENT)
Dept: GENERAL RADIOLOGY | Facility: CLINIC | Age: 61
End: 2021-01-15
Attending: FAMILY MEDICINE
Payer: COMMERCIAL

## 2021-01-15 VITALS
RESPIRATION RATE: 18 BRPM | OXYGEN SATURATION: 96 % | HEART RATE: 79 BPM | DIASTOLIC BLOOD PRESSURE: 68 MMHG | TEMPERATURE: 98.4 F | WEIGHT: 200 LBS | SYSTOLIC BLOOD PRESSURE: 127 MMHG | BODY MASS INDEX: 36.29 KG/M2

## 2021-01-15 DIAGNOSIS — M23.92 INTERNAL DERANGEMENT OF KNEE, LEFT: ICD-10-CM

## 2021-01-15 PROCEDURE — 99283 EMERGENCY DEPT VISIT LOW MDM: CPT | Performed by: FAMILY MEDICINE

## 2021-01-15 PROCEDURE — 99284 EMERGENCY DEPT VISIT MOD MDM: CPT | Performed by: FAMILY MEDICINE

## 2021-01-15 PROCEDURE — 73560 X-RAY EXAM OF KNEE 1 OR 2: CPT | Mod: LT

## 2021-01-15 RX ORDER — HYDROCODONE BITARTRATE AND ACETAMINOPHEN 5; 325 MG/1; MG/1
1-2 TABLET ORAL EVERY 6 HOURS PRN
Qty: 12 TABLET | Refills: 0 | Status: SHIPPED | OUTPATIENT
Start: 2021-01-15 | End: 2023-09-06

## 2021-01-15 NOTE — ED AVS SNAPSHOT
[facility]  [address] [place] [address]  Phone: [phone]  Fax: [phone]                                    Yumi Huitron   MRN: [id number]    Department: [facility]   Date of Visit: 1/15/2021           After Visit Summary Signature Page    I have received my discharge instructions, and my questions have been answered. I have discussed any challenges I see with this plan with the nurse or doctor.    ..........................................................................................................................................  Patient/Patient Representative Signature      ..........................................................................................................................................  Patient Representative Print Name and Relationship to Patient    ..................................................               ................................................  Date                                   Time    ..........................................................................................................................................  Reviewed by Signature/Title    ...................................................              ..............................................  Date                                               Time          22EPIC Rev 08/18

## 2021-01-15 NOTE — ED NOTES
Pt was walking out a door this AM at 1000, pt fell, injuring left knee, states she felt something pop or tear, pt states unable to bear weight has been icing, took ibuprofen 1600 mg at 1025. Pt c/o pain lateral side of knee, pt has 2 small abrasions on knee. CMS intact.

## 2021-01-15 NOTE — ED PROVIDER NOTES
HPI   The patient is a 60-year-old female presenting with an injury to her left knee.  She recently received a cortisone injection into the left knee for arthritic pain.  She denies prior surgery involving the knee.  She denies prior meniscus or ACL/PCL injury.    At about 10:00 this morning the patient was at her home when her left knee buckled while trying to walk.  She felt a pop and tearing sensation in the left lateral knee as this happened.  She fell to the ground but not directly onto the knee.  She has had severe pain with any attempt at weightbearing, this involving her left lateral knee specifically.  She describes local swelling and tightness of the knee.  She denies other injury.  When not moving the knee the pain is mild to moderate.        Allergies:  Allergies   Allergen Reactions     Mold      No Clinical Screening - See Comments      Seasonale      Amoxicillin Rash     And blisters on hands     Problem List:    Patient Active Problem List    Diagnosis Date Noted     PETER (obstructive sleep apnea) 03/28/2017     Priority: Medium     Mild PETER without sleep-associated hypoxemia  3/27/2017 - PSG.  AHI ~10, maria teresa SpO2 84%, time of SpO2 <= 88% of 0.6 minutes.  Multiple supine REM periods observed.  Noted that sleep onset was fast around 10:05pm, no frequent or prolonged awakenings, and awoke naturally at ~6:10am.  She notes feeling like she slept very well.  Noted delayed REM latency.         S/P hysterectomy 05/21/2012     Priority: Medium     Supracervical hysterectomy and bilateral salpingectomy. Needs pap             History of hysterectomy leaving cervix intact 05/21/2012     Priority: Medium     Psoriasis 05/21/2012     Priority: Medium     Other general medical examination for administrative purposes 05/21/2012     Priority: Medium     ANAI at [place] but regular health care at [facility] - follow up on thyroid nodule there        Past Medical History:    History reviewed. No pertinent past medical  history.  Past Surgical History:    Past Surgical History:   Procedure Laterality Date     CHOLECYSTECTOMY  2006    gallbladder removed     GYN SURGERY  2003    Supracervical hysterectomy and bilateral salpingectomy.     Family History:    Family History   Problem Relation Age of Onset     Diabetes Paternal Grandmother      Social History:  Marital Status:  Single [1]  Social History     Tobacco Use     Smoking status: Never Smoker     Smokeless tobacco: Never Used   Substance Use Topics     Alcohol use: Yes     Comment: Moderate     Drug use: No      Medications:         HYDROcodone-acetaminophen (NORCO) 5-325 MG tablet       busPIRone (BUSPAR) 15 MG tablet       citalopram (CELEXA) 40 MG tablet       cyclobenzaprine (FLEXERIL) 10 MG tablet       EPINEPHrine 0.3 MG/0.3ML injection       estradiol (ESTRACE) 2 MG tablet       fluticasone (FLOVENT DISKUS) 50 MCG/BLIST AEPB       hydrochlorothiazide (HYDRODIURIL) 25 MG tablet       HYDROmorphone (DILAUDID) 2 MG tablet       hydrOXYzine (VISTARIL) 25 MG capsule       ibuprofen (ADVIL/MOTRIN) 800 MG tablet       lisinopril (PRINIVIL/ZESTRIL) 20 MG tablet       rOPINIRole (REQUIP) 0.25 MG tablet       traZODone (DESYREL) 50 MG tablet      Review of Systems   All other systems reviewed and are negative.      PE   BP: 124/60  Pulse: 80  Temp: 98.4  F (36.9  C)  Resp: 18  Weight: 90.7 kg (200 lb)  SpO2: 96 %  Physical Exam  Vitals signs reviewed.   Constitutional:       General: She is not in acute distress.     Appearance: She is well-developed.   HENT:      Head: Normocephalic and atraumatic.      Right Ear: External ear normal.      Left Ear: External ear normal.      Nose: Nose normal.      Mouth/Throat:      Mouth: Mucous membranes are moist.      Pharynx: Oropharynx is clear.   Eyes:      Extraocular Movements: Extraocular movements intact.      Conjunctiva/sclera: Conjunctivae normal.      Pupils: Pupils are equal, round, and reactive to light.   Neck:       Musculoskeletal: Normal range of motion.   Cardiovascular:      Rate and Rhythm: Normal rate and regular rhythm.   Pulmonary:      Effort: Pulmonary effort is normal.   Musculoskeletal:      Comments: There is some abrasion on the left anterior knee.  There is tenderness along the left lateral knee.  There are some mild tenderness along the left posterior knee.  She has nearly full flexion but it does feel tight and limited as she reaches its extreme.  She is able to extend and lift the foot off the bed.  No anterior knee tenderness.  No deformity.  No obvious effusion.  No proximal leg or calf pain or tenderness.   Skin:     General: Skin is warm and dry.   Neurological:      Mental Status: She is alert and oriented to person, place, and time.   Psychiatric:         Behavior: Behavior normal.         ED COURSE and MDM   1704.  X-ray of the left knee is unremarkable for acute bone pathology.  However, there is a nonspecific effusion described which is likely trauma related.  And internal knee derangement is likely.  She refuses a knee immobilizer.  She refuses crutches.  Prescription for hydrocodone/acetaminophen given, 12 tablets.  Follow-up discussed.    LABS  Labs Ordered and Resulted from Time of ED Arrival Up to the Time of Departure from the ED - No data to display    IMAGING  Images reviewed by me.  Radiology report also reviewed.  XR Knee Left 1/2 Views   Final Result   IMPRESSION: Nonspecific joint effusion. Otherwise unremarkable knee   radiographs.      DENI WILKINS MD          Procedures    Medications - No data to display      IMPRESSION       ICD-10-CM    1. Internal derangement of knee, left  M23.92             Medication List      Started    HYDROcodone-acetaminophen 5-325 MG tablet  Commonly known as: NORCO  1-2 tablets, Oral, EVERY 6 HOURS PRN, maximum 6 tablet(s) per day                          Yung York MD  01/15/21 1966

## 2021-01-15 NOTE — DISCHARGE INSTRUCTIONS
Return to the Emergency Room if the following occurs:     Severely worsened pain, or for any concern at anytime.    Or, follow-up with the following provider as we discussed:     Return to your primary doctor as needed, or if not improved over the next 1-2 weeks.    Medications discussed:    Ibuprofen / tylenol alternating every three hours for comfort, as needed.  Norco (5/325) 1-2 tabs every 6 hours for pain.  Note, each tab has 325 mg tylenol.  Do not exceed 4000 mg tylenol per 24 hours.  MiraLax OTC.  Titrate the medicine to achieve a soft, easy stool every 1-2 days.  Weight bear and use the left lower extremity as able.    If you received pain-relieving or sedating medication during your time in the ER, avoid alcohol, driving automobiles, or working with machinery.  Also, a responsible adult must stay with you.        Call the Nurse Advice Line at [phone] or [phone] for any concern at anytime.

## 2021-03-01 ENCOUNTER — RECORDS - HEALTHEAST (OUTPATIENT)
Dept: ADMINISTRATIVE | Facility: OTHER | Age: 61
End: 2021-03-01

## 2021-03-04 ENCOUNTER — RECORDS - HEALTHEAST (OUTPATIENT)
Dept: ADMINISTRATIVE | Facility: OTHER | Age: 61
End: 2021-03-04

## 2021-03-09 ASSESSMENT — MIFFLIN-ST. JEOR: SCORE: 1379.62

## 2021-03-14 ENCOUNTER — AMBULATORY - HEALTHEAST (OUTPATIENT)
Dept: SURGERY | Facility: CLINIC | Age: 61
End: 2021-03-14

## 2021-03-14 DIAGNOSIS — Z11.59 ENCOUNTER FOR SCREENING FOR OTHER VIRAL DISEASES: ICD-10-CM

## 2021-04-18 ENCOUNTER — AMBULATORY - HEALTHEAST (OUTPATIENT)
Dept: FAMILY MEDICINE | Facility: CLINIC | Age: 61
End: 2021-04-18

## 2021-04-18 DIAGNOSIS — Z11.59 ENCOUNTER FOR SCREENING FOR OTHER VIRAL DISEASES: ICD-10-CM

## 2021-04-19 ASSESSMENT — MIFFLIN-ST. JEOR: SCORE: 1397.76

## 2021-04-20 ENCOUNTER — COMMUNICATION - HEALTHEAST (OUTPATIENT)
Dept: SCHEDULING | Facility: CLINIC | Age: 61
End: 2021-04-20

## 2021-04-21 ENCOUNTER — ANESTHESIA - HEALTHEAST (OUTPATIENT)
Dept: SURGERY | Facility: CLINIC | Age: 61
End: 2021-04-21

## 2021-04-21 ENCOUNTER — SURGERY - HEALTHEAST (OUTPATIENT)
Dept: SURGERY | Facility: CLINIC | Age: 61
End: 2021-04-21

## 2021-04-22 ASSESSMENT — MIFFLIN-ST. JEOR: SCORE: 1397.76

## 2021-06-05 VITALS — BODY MASS INDEX: 35.88 KG/M2 | HEIGHT: 62 IN | WEIGHT: 195 LBS

## 2021-06-16 NOTE — ANESTHESIA PREPROCEDURE EVALUATION
Anesthesia Evaluation      Patient summary reviewed   No history of anesthetic complications     Airway   Mallampati: II  Neck ROM: full   Pulmonary - negative ROS and normal exam                          Cardiovascular - normal exam  (+) hypertension, ,      Neuro/Psych - negative ROS     Endo/Other    (+) arthritis, obesity,      GI/Hepatic/Renal    (+) GERD well controlled,             Dental - normal exam                        Anesthesia Plan  Planned anesthetic: spinal  ACB, tibial nerve block for postop pain  Mag drip  ASA 2     Anesthetic plan and risks discussed with: patient    Post-op plan: routine recovery

## 2021-06-16 NOTE — ANESTHESIA PROCEDURE NOTES
Spinal Block    Patient location during procedure: OR  Start time: 4/21/2021 8:52 AM  End time: 4/21/2021 8:55 AM  Reason for block: primary anesthetic    Staffing:  Performing  Anesthesiologist: Chava Pabon MD    Preanesthetic Checklist  Completed: patient identified, risks, benefits, and alternatives discussed, timeout performed, consent obtained, at patient's request, airway assessed, oxygen available, suction available, emergency drugs available and hand hygiene performed  Spinal Block  Patient position: sitting  Prep: ChloraPrep and site prepped and draped  Patient monitoring: heart rate, cardiac monitor, continuous pulse ox and blood pressure  Approach: midline  Location: L3-4  Injection technique: single-shot  Needle type: pencil-tip   Needle gauge: 24 G

## 2021-06-16 NOTE — ANESTHESIA PROCEDURE NOTES
Peripheral Block    Patient location during procedure: pre-op  Start time: 4/21/2021 7:57 AM  End time: 4/21/2021 7:59 AM  post-op analgesia per surgeon order as noted in medical record  Staffing:  Performing  Anesthesiologist: Chava Pabon MD  Preanesthetic Checklist  Completed: patient identified, site marked, risks, benefits, and alternatives discussed, timeout performed, consent obtained, at patient's request, airway assessed, oxygen available, suction available, emergency drugs available and hand hygiene performed  Peripheral Block  Block type: saphenous, adductor canal block  Prep: ChloraPrep  Patient position: supine  Patient monitoring: cardiac monitor, continuous pulse oximetry, heart rate and blood pressure  Laterality: left  Injection technique: ultrasound guided    Ultrasound used to visualize needle placement in proximity to nerve being blocked: yes   US used to visualize anesthetic spread  Visualized anatomic structures normal  No Pathological Findings  Permanent ultrasound image captured for medical record  Sterile gel and probe cover used for ultrasound.  Needle  Needle type: Stimuplex   Needle gauge: 20G  Needle length: 4 in  no peripheral nerve catheter placed  Assessment  Injection assessment: no difficulty with injection, negative aspiration for heme, no paresthesia on injection and incremental injection

## 2021-06-16 NOTE — ANESTHESIA CARE TRANSFER NOTE
Last vitals:   Vitals:    04/21/21 0830   BP: 121/69   Pulse: 79   Resp: 14   Temp:    SpO2: 100%     Patient's level of consciousness is awake  Spontaneous respirations: yes  Maintains airway independently: yes  Dentition unchanged: yes  Oropharynx: oropharynx clear of all foreign objects    QCDR Measures:  ASA# 20 - Surgical Safety Checklist: WHO surgical safety checklist completed prior to induction    PQRS# 430 - Adult PONV Prevention: 4558F - Pt received => 2 anti-emetic agents (different classes) preop & intraop  ASA# 8 - Peds PONV Prevention: NA - Not pediatric patient, not GA or 2 or more risk factors NOT present  PQRS# 424 - [place]anna-op Temp Management: 4559F - At least one body temp DOCUMENTED => 35.5C or 95.9F within required timeframe  PQRS# 426 - PACU Transfer Protocol: - Transfer of care checklist used  ASA# 14 - Acute Post-op Pain: ASA14B - Patient did NOT experience pain >= 7 out of 10

## 2021-06-16 NOTE — ANESTHESIA PROCEDURE NOTES
Peripheral Block    Patient location during procedure: pre-op  Start time: 4/21/2021 7:59 AM  End time: 4/21/2021 8:01 AM  post-op analgesia per surgeon order as noted in medical record  Staffing:  Performing  Anesthesiologist: Chava Pabon MD  Preanesthetic Checklist  Completed: patient identified, site marked, risks, benefits, and alternatives discussed, timeout performed, consent obtained, at patient's request, airway assessed, oxygen available, suction available, emergency drugs available and hand hygiene performed  Peripheral Block  Block type: other, tibial  Prep: ChloraPrep  Patient position: supine  Patient monitoring: cardiac monitor, continuous pulse oximetry, blood pressure and heart rate  Laterality: left  Injection technique: ultrasound guided    Ultrasound used to visualize needle placement in proximity to nerve being blocked: yes   US used to visualize anesthetic spread  Visualized anatomic structures normal  No Pathological Findings  Permanent ultrasound image captured for medical record  Sterile gel and probe cover used for ultrasound.  Needle  Needle type: Stimuplex   Needle gauge: 20G  Needle length: 4 in  no peripheral nerve catheter placed  Assessment  Injection assessment: no difficulty with injection, no paresthesia on injection, negative aspiration for heme and incremental injection

## 2023-01-18 NOTE — ANESTHESIA POSTPROCEDURE EVALUATION
ED Provider Note    CHIEF COMPLAINT  Chief Complaint   Patient presents with    Vaginal Bleeding    Dental Pain       EXTERNAL RECORDS REVIEWED  Other prior ED notes including visit for URI symptoms and dental pain    HPI/ROS  LIMITATION TO HISTORY   Select: : None  OUTSIDE HISTORIAN(S):  None    Ivette Jett is a 45 y.o. female who presents with vaginal bleeding.  Patient states that she has not had her period for 2 months however about a week ago started to develop intermittent spotting.  She said it was very light at first but now feels like a period.  She has some mild abdominal cramping associated.  She has no nausea or vomiting.  No vaginal discharge or odor.  She is currently sexually active.  She is specifically concerned that she is pregnant.  She took a home pregnancy test that was indeterminant.  She is also complaining of dental pain.  She says that this has been ongoing for months but is starting to become more severe again.  She has previously taken penicillin which helps.  No fevers or chills. No urinary symptoms.     PAST MEDICAL HISTORY   Denies    SURGICAL HISTORY  patient denies any surgical history    FAMILY HISTORY  History reviewed. No pertinent family history.    SOCIAL HISTORY  Social History     Tobacco Use    Smoking status: Every Day     Packs/day: 0.25     Types: Cigarettes    Smokeless tobacco: Never   Vaping Use    Vaping Use: Never used   Substance and Sexual Activity    Alcohol use: Yes     Comment: occasionally    Drug use: Not Currently    Sexual activity: Not on file       CURRENT MEDICATIONS  Home Medications       Reviewed by Marcia Beckwith R.N. (Registered Nurse) on 01/17/23 at 2146  Med List Status: Not Addressed     Medication Last Dose Status   Dextromethorphan Polistirex ER (ROBITUSSIN 12 HOUR COUGH) 30 MG/5ML Suspension Extended Release  Active   DM-Phenylephrine-Acetaminophen (ROBITUSSIN COLD+FLU DAYTIME) 10-5-325 MG Cap  Active   FLUoxetine (PROZAC) 20  Patient: Yumi Huitron  Procedure(s):  LEFT TOTAL KNEE ARTHROPLASTY (Left)  Anesthesia type: spinal    Patient location: PACU  Last vitals:   Vitals Value Taken Time   /62 04/21/21 1100   Temp 36.8  C (98.2  F) 04/21/21 1050   Pulse 80 04/21/21 1101   Resp 14 04/21/21 1055   SpO2 95 % 04/21/21 1101   Vitals shown include unvalidated device data.  Post vital signs: stable  Level of consciousness: awake and responds to simple questions  Post-anesthesia pain: pain controlled  Post-anesthesia nausea and vomiting: no  Pulmonary: unassisted, return to baseline  Cardiovascular: stable and blood pressure at baseline  Hydration: adequate  Anesthetic events: no    QCDR Measures:  ASA# 11 - [place]-op Cardiac Arrest: ASA11B - Patient did NOT experience unanticipated cardiac arrest  ASA# 12 - [place]-op Mortality Rate: ASA12B - Patient did NOT die  ASA# 13 - PACU Re-Intubation Rate: NA - No ETT / LMA used for case  ASA# 10 - Composite Anes Safety: ASA10A - No serious adverse event    Additional Notes:   "MG Cap  Active   ibuprofen (MOTRIN) 600 MG Tab  Active   multivitamin (THERAGRAN) Tab  Active   Phenazopyridine HCl (AZO URINARY PAIN PO)  Active                    ALLERGIES  No Known Allergies    PHYSICAL EXAM  VITAL SIGNS: BP (!) 145/81   Pulse 84   Temp 36.4 °C (97.5 °F) (Oral)   Resp 16   Ht 1.626 m (5' 4\")   Wt 59.5 kg (131 lb 2.8 oz)   SpO2 99%   BMI 22.52 kg/m²    Constitutional: Awake and alert   HENT: Multiple dental caries noted . No abscess appreciated  Eyes: Normal inspection  Neck: Grossly normal range of motion.  Cardiovascular: Normal heart rate, Normal rhythm.  Symmetric peripheral pulses.   Thorax & Lungs: No respiratory distress, No wheezing, No rales, No rhonchi,   Abdomen: Soft, non-distended, nontender, no mass  Pelvic: Patient declined declined  Skin: No obvious rash.  Extremities: Warm, well perfused. No clubbing, cyanosis, edema   Neurologic: Grossly normal   Psychiatric: Normal for situation      DIAGNOSTIC STUDIES / PROCEDURES    LABS  Results for orders placed or performed during the hospital encounter of 01/17/23   BETA-HCG QUALITATIVE URINE   Result Value Ref Range    Beta-Hcg Urine Negative Negative       COURSE & MEDICAL DECISION MAKING    ED Observation Status? No; Patient does not meet criteria for ED Observation.     INITIAL ASSESSMENT AND PLAN  Care Narrative: This is a 45-year-old female who presents with vaginal spotting.  She arrives mildly hypertensive but otherwise has normal vital signs and is clinically well-appearing.  She has no abdominal tenderness on exam and I doubt acute intra-abdominal process including but not limited to appendicitis, ovarian torsion, small bowel obstruction, intra-abdominal infection among others.  Her pregnancy test is negative. She refused a pelvic exam.  I doubt she is anemia based on history and exam.  I do not see an indication for lab work or advanced imaging today.  Most likely her bleeding related to irregular menstrual periods. She " is premenopausal and malignancy is unlikely.  She has multiple dental caries on exam.  I gave her Tylenol and ibuprofen here with some relief.  I also sent a prescription for penicillin.  I advised dental follow-up and she expresses understanding.    ADDITIONAL PROBLEM LIST AND DISPOSITION    Problem #1: Vaginal Bleeding  Problem #2: Dental Caries    I have discussed management of the patient with the following physicians and EMMANUEL's:  None    Discussion of management with other QHP or appropriate source(s): None     Escalation of care considered, and ultimately not performed:blood analysis and diagnostic imaging    Barriers to care at this time, including but not limited to: Patient does not have established PCP, Patient is homeless, and Patient lacks financial resources.     Decision tools and prescription drugs considered including, but not limited to: Pain Medications Ok for ibuprofen and tylenol .    HTN/IDDM FOLLOW UP:  The patient is referred to a primary physician for blood pressure management, diabetic screening, and for all other preventive health concerns        FINAL DIAGNOSIS  1. Dysfunctional uterine bleeding    2. Dental caries               Electronically signed by: Brittany Slater M.D., 1/17/2023 10:18 PM

## 2023-07-20 ENCOUNTER — ANCILLARY PROCEDURE (OUTPATIENT)
Dept: GENERAL RADIOLOGY | Facility: CLINIC | Age: 63
End: 2023-07-20
Attending: PODIATRIST
Payer: MEDICARE

## 2023-07-20 ENCOUNTER — OFFICE VISIT (OUTPATIENT)
Dept: PODIATRY | Facility: CLINIC | Age: 63
End: 2023-07-20
Payer: MEDICARE

## 2023-07-20 VITALS
BODY MASS INDEX: 36.8 KG/M2 | WEIGHT: 200 LBS | DIASTOLIC BLOOD PRESSURE: 77 MMHG | SYSTOLIC BLOOD PRESSURE: 114 MMHG | HEIGHT: 62 IN | HEART RATE: 65 BPM

## 2023-07-20 DIAGNOSIS — M20.5X1 ACQUIRED MALLET TOE, RIGHT: ICD-10-CM

## 2023-07-20 DIAGNOSIS — M20.21 HALLUX RIGIDUS, RIGHT FOOT: ICD-10-CM

## 2023-07-20 DIAGNOSIS — M77.51 CAPSULITIS OF METATARSOPHALANGEAL (MTP) JOINT OF RIGHT FOOT: ICD-10-CM

## 2023-07-20 DIAGNOSIS — M77.52 CAPSULITIS OF METATARSOPHALANGEAL (MTP) JOINT OF LEFT FOOT: Primary | ICD-10-CM

## 2023-07-20 PROCEDURE — 99204 OFFICE O/P NEW MOD 45 MIN: CPT | Performed by: PODIATRIST

## 2023-07-20 PROCEDURE — 73630 X-RAY EXAM OF FOOT: CPT | Mod: TC | Performed by: RADIOLOGY

## 2023-07-20 ASSESSMENT — PAIN SCALES - GENERAL: PAINLEVEL: MILD PAIN (3)

## 2023-07-20 NOTE — PATIENT INSTRUCTIONS
Next Steps:      Support:  Wear supportive shoes, sandals, boots and/or inserts that have a rigid supportive sole.    This will offload the majority of tension forces that travel through your feet every step you take.    Skechers Max Cushioning Elite/Premier   S[employer]chers Relax Fit D'Lux Walker  [employer]ebok Walk Ultra 7 DMX MAX   Hoka Bondi walking shoes  [employer] shoes/slippers ([email or web address])  [employer] sandals ([email or web address])  Superfeet inserts ([email or web address])  It is important that you also wear supportive shoe wear in the house to continue providing support to your feet.    You may always use a cushioned liner for your shoes if that makes your feet feel better.  Stretching  Calf stretching is essential to offload the tension forces that travel through your feet every step you take  Preferred calf stretch is the Runner's Stretch  Place one foot behind the other foot, flat against the ground (it is important to keep the heel on the ground).  The back leg is the one that will be stretched.  Start with the knee straight and lean your hips into the wall, counter or whatever you are leaning into - count to ten.  Next, bend the knee.  You should feel the stretch lower in the calf muscle - count to ten.  Repeat this stretch once an hour to start off with.  When symptoms subside, I recommend performing the stretch 3 times daily to prevent any future problems.                Tissue Massage  It is important that you physically loosen the inflammation tissue to help your body heal the injured tissue.  I recommend soaking your foot in warm water to increase the microcirculation to the soft tissues.  You may add Epson salt to the water if you prefer.  You may apply an over-the-counter muscle rub, such as Voltaren gel, and deeply massage the injured tissue.  Reduce Inflammation  You can ice the injured tissue with an ice pack with a light cloth covering or soaking in ice water 20 minutes to reduce  any acute inflammation, typically at the end of the day.      It is important to understand that most problems that develop in the foot and ankle are caused by excessive tension that cause microinjury to the soft tissues and inflammation in the foot and ankle.  By addressing the underlying causes with support and stretching as well as treating the current inflammatory conditions with tissue massage and anti-inflammatory treatments, most foot and ankle musculoskeletal conditions will resolve.  This may take time to heal.  However, if symptoms persist past 4 weeks you should return to the office for reevaluation to determine further treatment options.      Surgery Scheduling   You have elected to proceed with surgery for a distal interphalangeal joint arthroplasty of the second toe, right foot.  Dr. De Luna performs his surgeries on Tuesdays at [facility].   To schedule your surgery date please call [phone].  If no one answers, please leave a message with a good time for our staff to call you back.    - Please have a date in mind for your surgery, you can feel free to leave that date on the message, and we will schedule and call back to confirm.   - You can expect to receive a call back the same day or on the next business day from Dr. De Luna s team to assist in the scheduling.   We will assist to schedule the date of your surgery.    The time will be determined a few days ahead of time.    You can expect a call from Same Day Surgery 2-3 days ahead of time with specific instructions for what time to arrive at the hospital as well as any other preparations you should take prior to surgery.  You may need to obtain a pre-operative physical from a primary medical provider.    This must be done within 30 days of your surgery date.  We will also schedule your first post-operative appointment for a bandage and wound check for the Monday following your surgery at the [facility] [place]  You may be  non-weight bearing for a period of up to 6 weeks.  Options for this include: (Please indicate which you would prefer so we can provide you with an order and instructions)  Crutches  Walker  Roll-a-bout knee walker.  If you will need paperwork filled out for your employer you may drop those off at the clinic directly or you may have those faxed to us at [phone].  Please indicate on the form the date you would like the LA to begin if it will not be your surgery date.    The forms are typically filled out for up to 12 weeks, however you may be cleared to return prior to that time depending on your individual healing and job requirements.    GETTING READY FOR YOUR SURGERY    ONE-THREE WEEKS BEFORE  1. See your Family Doctor or Primary Care Provider for a Preoperative History and Physical.    2. Please see pre-surgical medications below to which medications need to be stopped before surgery and when.    SAME DAY SURGERY PATIENTS  1. You will need a family member of friend to drive you home. If you do not have one the surgery will be cancelled or rescheduled.  2. You will need a responsible adult to stay with you that night after the surgery.       We will ask this person to listen to some instructions before you leave the hospital.    DAY BEFORE SURGERY  1. DO NOT EAT OR DRINK ANYTHING AFTER MIDNIGHT THE NIGHT  BEFORE YOUR SURGERY!   2. DO NOT DRINK ALCOHOL.  3. Do not take over the counter drugs.  4. Some people need to have blood tests at the hospital. If you need blood tests, we will tell you in advance.  5. Take medications as directed by your doctor. You may take these with a small amount of water.  6. Do not chew gum, chew tobacco, or suck on hard candy the day of surgery.  7. Bring your insurance cards, a list of your medicines and co-pays you might need. Leave jewelry and other valuables at home.      PRESURGICAL MEDICATIONS:  Certain prescription, over-the-counter, and herbal medications interfere with  healing after an operation. The main concern relates to medications that increase bleeding at the surgical site. Excess blood under the incision results in poor wound healing, excess pain, increased scarring, and a higher risk of infection.    Some medications slow the healing process of bone. Medications can also interfere with the anesthesia drugs that keep you asleep during the operation. It is important to ensure that these medications are out of your system prior to the operation. The list below details a number of medications that are of concern. Pay special attention to how long you should avoid these medications before your operation. Please note that this list is not complete. You should ask your surgeon or pharmacist if you are uncertain about other medications. Any herbal supplement not listed should be discontinued at least one week prior to surgery.    Aspirin: Hold for one week prior to surgery and restart the day after surgery. This over the counter medication promotes bleeding.    Motrin / Ibuprofen / Aleve / Advil / NSAIDS:  Stop one week prior to surgery. These medications affect bleeding and may cause delay in bone healing. Avoid taking these medications for six weeks after bone surgeries. Other procedures may allow you to restart sooner than 6 weeks after surgery.    Coumadin / Plavix: Your primary care provider will manage Coumadin in relation to surgery. Coumadin may result in excessive bleeding and may be adjusted before and after surgery.    Enbriel: Stop two weeks prior to surgery and restart two weeks after surgery. This medication can effect soft tissue healing and increases the risk of infection.    Remicade: Stop 8-12 weeks before surgery and restart two weeks after surgery. This medication can affect soft tissue healing and increases the risk of infection.    Humira: Stop 4 weeks before surgery and restart two weeks after surgery. This medication can affect soft tissue healing and  increases the risk of infection.    Methotrexate: Stop one dose prior to surgery. This medication will be restarted when the wound appears to be healing well. Please ask your surgeon about restarting this medication when you are being seen in the office for wound checks.    Kava: Stop at least one day prior to surgery and may restart one day after surgery. Kava may increase the sedative effect of anesthetics that are given during the operation. Kava can also increase bleeding at the surgical site.    Ephedra (ma patel): Stop at least one day prior to surgery and may restart one day after surgery.  Ephedra may increase the risk of heart attack and stroke. This medication can also increase bleeding at the surgical site.    [place]'s Wort: Stop at least five days before surgery and may restart one day after surgery. Malia's wort may diminish the effects of several drugs that are given during surgery.    Ginseng: Stop at least one week prior to surgery and may restart one day after surgery.  Ginseng lowers blood sugar and may increase bleeding at the surgical site.    Ginkgo: Stop 36 hours before surgery and may restart one day after surgery. Ginkgo may increase bleeding at the surgical site.    Garlic: Stop at least one week prior to surgery and may restart one day after. Garlic may increase bleeding at the surgery site.    Valerian: Do a slow steady decrease in your daily dose over a period of 2-3 weeks before surgery to decrease the chance of withdrawal symptoms. Valerian may increase the sedative effect of anesthetics given during the operation.    Echinacea: There is no data on stopping echinacea prior to surgery. This medication though can be associated with allergic reactions and suppression of your immune system.    Vitamin E, Omega-3, Flax, Fish Oil, Glucosamine and Chondroitin: Stop 2 weeks prior to surgery and may restart one day after. These herbal medications can increase risk of bleeding at surgical  site.

## 2023-07-20 NOTE — PROGRESS NOTES
PATIENT HISTORY:  Yumi Huitron is a 63 year old female who presents to clinic as a self referral with a chief complaint of bilateral foot pain more in the right foot.  The patient is seen with her grandkids.  The patient relates the pain is primarily located around the first three toes and the ball of the foot.  Back in the 80's her foot got stepped on by a ski boot.  The patient relates that the symptoms have been going on for several year(s).  The patient has previously tried different shoes, epsom salt soaks, ice. Elevation, medications and rest with little relief.  The patient is currently employed as sales.  Any previous notes and studies that pertain to the patient's condition were reviewed.    Pertinent medical, surgical and family history was reviewed in the [facility] chart.    Past Medical History:   Past Medical History:   Diagnosis Date     Anemia      Arthritis     OA     Depression      GERD (gastroesophageal reflux disease)      Hypertension      Migraines        Medications:   Current Outpatient Medications:      busPIRone (BUSPAR) 15 MG tablet, Take 15 mg by mouth 3 times daily, Disp: , Rfl:      citalopram (CELEXA) 40 MG tablet, Take 40 mg by mouth daily. 1 per day, Disp: , Rfl:      cyclobenzaprine (FLEXERIL) 10 MG tablet, Take 10 mg by mouth once as needed for muscle spasms, Disp: , Rfl:      EPINEPHrine 0.3 MG/0.3ML injection, Inject 0.3 mg into the muscle as needed for anaphylaxis, Disp: , Rfl:      estradiol (ESTRACE) 2 MG tablet, Take 2 mg by mouth daily, Disp: , Rfl:      fluticasone (FLOVENT DISKUS) 50 MCG/BLIST AEPB, Inhale 1 puff into the lungs every 12 hours, Disp: , Rfl:      hydrochlorothiazide (HYDRODIURIL) 25 MG tablet, Take 25 mg by mouth daily, Disp: , Rfl:      HYDROcodone-acetaminophen (NORCO) 5-325 MG tablet, Take 1-2 tablets by mouth every 6 hours as needed for pain maximum 6 tablet(s) per day, Disp: 12 tablet, Rfl: 0     ibuprofen (ADVIL/MOTRIN) 800 MG tablet, Take 800 mg by  "mouth every 8 hours as needed for moderate pain, Disp: , Rfl:      lisinopril (PRINIVIL/ZESTRIL) 20 MG tablet, Take 20 mg by mouth daily, Disp: , Rfl:      rOPINIRole (REQUIP) 0.25 MG tablet, Take 0.25 mg by mouth daily, Disp: , Rfl:      traZODone (DESYREL) 50 MG tablet, Take 50 mg by mouth At Bedtime. 1 hs prn, Disp: , Rfl:      HYDROmorphone (DILAUDID) 2 MG tablet, Take 1 tablet (2 mg) by mouth every 4 hours as needed for moderate to severe pain (Patient not taking: Reported on 7/20/2023), Disp: 8 tablet, Rfl: 0     hydrOXYzine (VISTARIL) 25 MG capsule, Take 25 mg by mouth 2 times daily as needed for itching (Patient not taking: Reported on 7/20/2023), Disp: , Rfl:      Allergies:    Allergies   Allergen Reactions     Mold      No Clinical Screening - See Comments      Seasonale      Amoxicillin Rash     And blisters on hands       Vitals: /77   Pulse 65   Ht 1.575 m (5' 2\")   Wt 90.7 kg (200 lb)   BMI 36.58 kg/m    BMI= Body mass index is 36.58 kg/m .    LOWER EXTREMITY PHYSICAL EXAM    Dermatologic: Skin is intact to right and left lower extremity without significant lesions, rash or abrasion.        Vascular: DP & PT pulses are intact & regular on the right and left.   CFT and skin temperature is normal to the right and left lower extremity.     Neurologic: Lower extremity sensation is intact to light touch.  No evidence of weakness in the right and left lower extremity.        Musculoskeletal: Patient is ambulatory without assistive device or brace.  No gross ankle deformity noted.  No foot or ankle joint effusion is noted.  Noted pain with limited range of motion of the first metatarsophalangeal joint on the right.  Noted pain on palpation under the second metatarsophalangeal joint bilaterally.  Noted mallet toe contracture of the second toe on the right foot with noted pain on palpation over the distal aspect of the second toe on the right.    Diagnostics:  Radiographs included three views of the " left and right foot demonstrating moderate degenerative changes to the first metatarsophalangeal joint on the right and mild degenerative changes on the left first metatarsophalangeal joint.  Noted flexure contracture of the distal interphalangeal joint of the second toe on the right foot with no cortical erosions or periosteal elevation.  All joint margins appear stable.  There is no apparent fracture or tumor formation noted.  There is no evidence of foreign body.  The images were independently reviewed by myself along with the patient explaining the findings.      ASSESSMENT / PLAN:     ICD-10-CM    1. Capsulitis of metatarsophalangeal (MTP) joint of left foot  M77.52 XR Foot Bilateral G/E 3 Views     Ankle/Foot Bracing Supplies DME Foot Insert; Bilateral      2. Capsulitis of metatarsophalangeal (MTP) joint of right foot  M77.51 XR Foot Bilateral G/E 3 Views     Ankle/Foot Bracing Supplies DME Foot Insert; Bilateral      3. Hallux rigidus, right foot  M20.21 XR Foot Bilateral G/E 3 Views     Ankle/Foot Bracing Supplies DME Foot Insert; Bilateral      4. Acquired mallet toe, right  M20.5X1 Case Request: Distal interphalangeal joint arthroplasty of the second toe, right foot    second toe          I have explained to Yumi about the conditions.  We discussed the underlying contributing factors to the condition as well as both conservative and surgical treatment options along with expected length of recovery.  At this time, the patient was educated on the importance of offloading supportive shoes and other devices.  I demonstrated to the patient calf stretches to perform every hour daily until symptoms resolve.  After symptoms resolve, the patient was advised to perform the stretches 3 times daily to prevent future recurrence.  The patient was instructed to perform warm soaks with Epson salt after which to also apply over-the-counter Voltaren gel to deeply massage the injured tissue.  The patient was instructed  to do this on a daily basis until symptoms resolve.  The patient was fitted with  Dynaflex inserts that will aid in offloading the tension forces to the soft tissues and prevent further inflammation.      The nature of the patient s toe condition was discussed at length.  I discussed with patient details of procedure(s), possible risks and complications, alternative treatment options and post-operative course detailed below.  Patient is aware that surgery is elective and can be avoided if desired.  Likely surgical procedures include a distal interphalangeal joint arthroplasty of the second toe on the right foot.  The patient was educated today about risks associated with surgery.  Risks of surgery include, but are not limited to: infection, painful scar, nerve injury, numbness, stiffness, over-correction, under-correction, non-union, need for repeat surgery, recurrence of condition, ongoing pain, delayed wound healing, blood clot in the legs or lungs, amputation or other unforeseen side effects from undergoing surgery.       The patient was instructed to not smoke or use nicotine patches before and after the surgery as this could result in poor outcomes due to slower healing potentially.  Risks of DVT/PE were discussed in relation to anticipated level of immobilization, inactivity, injury, surgery, medications and personal risk factors.  Perioperative education was provided regarding signs and symptoms of a blood clot.  The patient was encouraged to be vigilant regarding symptoms and pursue risk reduction measures.  Based on patient history, procedure and post-operative plan, risk outweighs benefit of chemical prophylaxis therefore mechanical prophylaxis was encouraged.  Lower extremity range of motion exercises are encouraged.   Postoperative pain regimens were discussed in great detail the patient.  The risks and benefits of non-narcotic and narcotic pain medications, as well as synergistic agents was also  discussed.  The patient will be prescribed Oxycodone for more severe breakthrough pain not relieved by scheduled Tylenol.  The patient was also encouraged to rest, elevate and ice postoperatively to help with swelling and pain control.  The patient was in agreement with this plan.  The patient understands that they would need to remain protected weightbearing with use of a postop shoe post-operatively.The recovery process was discussed including impact to work, walking, shoes and daily activities.  We discussed that the patient should anticipate up to 12 months for maximum recovery after surgery.  There is moderate risk involved.        After detailed discussed patient wishes to continue with the proposed surgical intervention.  The procedure will be performed  with a local block for anesthesia.   Consents will be reviewed and signed on the day of surgery.        Yumi verbalized agreement with and understanding of the rational for the diagnosis and treatment plan.  All questions were answered to best of my ability and the patient's satisfaction. The patient was advised to contact the clinic with any questions that may arise after the clinic visit.      Disclaimer: This note consists of symbols derived from keyboarding, dictation and/or voice recognition software. As a result, there may be errors in the script that have gone undetected. Please consider this when interpreting information found in this chart.       KORI De Luna D.P.M., F.ALEX.C.F.A.S.

## 2023-07-20 NOTE — NURSING NOTE
"Chief Complaint   Patient presents with     Consult     Bilateral foot pain more in right foot       Initial /77   Pulse 65   Ht 1.575 m (5' 2\")   Wt 90.7 kg (200 lb)   BMI 36.58 kg/m   Estimated body mass index is 36.58 kg/m  as calculated from the following:    Height as of this encounter: 1.575 m (5' 2\").    Weight as of this encounter: 90.7 kg (200 lb).  Medications and allergies reviewed.      Megan DUTTA MA    "
WDL

## 2023-07-20 NOTE — LETTER
7/20/2023         RE: Yumi Huitron  [address] [address] [facility] [address]        Dear Colleague,    Thank you for referring your patient, Yumi Huitron, to the [facility] [place]. Please see a copy of my visit note below.    PATIENT HISTORY:  Yumi Huitron is a 63 year old female who presents to clinic as a self referral with a chief complaint of bilateral foot pain more in the right foot.  The patient is seen with her grandkids.  The patient relates the pain is primarily located around the first three toes and the ball of the foot.  Back in the 80's her foot got stepped on by a ski boot.  The patient relates that the symptoms have been going on for several year(s).  The patient has previously tried different shoes, epsom salt soaks, ice. Elevation, medications and rest with little relief.  The patient is currently employed as sales.  Any previous notes and studies that pertain to the patient's condition were reviewed.    Pertinent medical, surgical and family history was reviewed in the [facility] chart.    Past Medical History:   Past Medical History:   Diagnosis Date     Anemia      Arthritis     OA     Depression      GERD (gastroesophageal reflux disease)      Hypertension      Migraines        Medications:   Current Outpatient Medications:      busPIRone (BUSPAR) 15 MG tablet, Take 15 mg by mouth 3 times daily, Disp: , Rfl:      citalopram (CELEXA) 40 MG tablet, Take 40 mg by mouth daily. 1 per day, Disp: , Rfl:      cyclobenzaprine (FLEXERIL) 10 MG tablet, Take 10 mg by mouth once as needed for muscle spasms, Disp: , Rfl:      EPINEPHrine 0.3 MG/0.3ML injection, Inject 0.3 mg into the muscle as needed for anaphylaxis, Disp: , Rfl:      estradiol (ESTRACE) 2 MG tablet, Take 2 mg by mouth daily, Disp: , Rfl:      fluticasone (FLOVENT DISKUS) 50 MCG/BLIST AEPB, Inhale 1 puff into the lungs every 12 hours, Disp: , Rfl:      hydrochlorothiazide (HYDRODIURIL) 25 MG tablet, Take  Vicente Ortiz MD 31 minutes ago (2:34 PM)        Babesia IgG antibody positive, IgM antibody negative suggestive of previous infection.  Rest of workup for anemia and neutropenia is unremarkable.  Would recommend bone marrow aspiration biopsy and follow-up 2 weeks later           "25 mg by mouth daily, Disp: , Rfl:      HYDROcodone-acetaminophen (NORCO) 5-325 MG tablet, Take 1-2 tablets by mouth every 6 hours as needed for pain maximum 6 tablet(s) per day, Disp: 12 tablet, Rfl: 0     ibuprofen (ADVIL/MOTRIN) 800 MG tablet, Take 800 mg by mouth every 8 hours as needed for moderate pain, Disp: , Rfl:      lisinopril (PRINIVIL/ZESTRIL) 20 MG tablet, Take 20 mg by mouth daily, Disp: , Rfl:      rOPINIRole (REQUIP) 0.25 MG tablet, Take 0.25 mg by mouth daily, Disp: , Rfl:      traZODone (DESYREL) 50 MG tablet, Take 50 mg by mouth At Bedtime. 1 hs prn, Disp: , Rfl:      HYDROmorphone (DILAUDID) 2 MG tablet, Take 1 tablet (2 mg) by mouth every 4 hours as needed for moderate to severe pain (Patient not taking: Reported on 7/20/2023), Disp: 8 tablet, Rfl: 0     hydrOXYzine (VISTARIL) 25 MG capsule, Take 25 mg by mouth 2 times daily as needed for itching (Patient not taking: Reported on 7/20/2023), Disp: , Rfl:      Allergies:    Allergies   Allergen Reactions     Mold      No Clinical Screening - See Comments      Seasonale      Amoxicillin Rash     And blisters on hands       Vitals: /77   Pulse 65   Ht 1.575 m (5' 2\")   Wt 90.7 kg (200 lb)   BMI 36.58 kg/m    BMI= Body mass index is 36.58 kg/m .    LOWER EXTREMITY PHYSICAL EXAM    Dermatologic: Skin is intact to right and left lower extremity without significant lesions, rash or abrasion.        Vascular: DP & PT pulses are intact & regular on the right and left.   CFT and skin temperature is normal to the right and left lower extremity.     Neurologic: Lower extremity sensation is intact to light touch.  No evidence of weakness in the right and left lower extremity.        Musculoskeletal: Patient is ambulatory without assistive device or brace.  No gross ankle deformity noted.  No foot or ankle joint effusion is noted.  Noted pain with limited range of motion of the first metatarsophalangeal joint on the right.  Noted pain on palpation " under the second metatarsophalangeal joint bilaterally.  Noted mallet toe contracture of the second toe on the right foot with noted pain on palpation over the distal aspect of the second toe on the right.    Diagnostics:  Radiographs included three views of the left and right foot demonstrating moderate degenerative changes to the first metatarsophalangeal joint on the right and mild degenerative changes on the left first metatarsophalangeal joint.  Noted flexure contracture of the distal interphalangeal joint of the second toe on the right foot with no cortical erosions or periosteal elevation.  All joint margins appear stable.  There is no apparent fracture or tumor formation noted.  There is no evidence of foreign body.  The images were independently reviewed by myself along with the patient explaining the findings.      ASSESSMENT / PLAN:     ICD-10-CM    1. Capsulitis of metatarsophalangeal (MTP) joint of left foot  M77.52 XR Foot Bilateral G/E 3 Views     Ankle/Foot Bracing Supplies DME Foot Insert; Bilateral      2. Capsulitis of metatarsophalangeal (MTP) joint of right foot  M77.51 XR Foot Bilateral G/E 3 Views     Ankle/Foot Bracing Supplies DME Foot Insert; Bilateral      3. Hallux rigidus, right foot  M20.21 XR Foot Bilateral G/E 3 Views     Ankle/Foot Bracing Supplies DME Foot Insert; Bilateral      4. Acquired mallet toe, right  M20.5X1 Case Request: Distal interphalangeal joint arthroplasty of the second toe, right foot    second toe          I have explained to Yumi about the conditions.  We discussed the underlying contributing factors to the condition as well as both conservative and surgical treatment options along with expected length of recovery.  At this time, the patient was educated on the importance of offloading supportive shoes and other devices.  I demonstrated to the patient calf stretches to perform every hour daily until symptoms resolve.  After symptoms resolve, the patient was  advised to perform the stretches 3 times daily to prevent future recurrence.  The patient was instructed to perform warm soaks with Epson salt after which to also apply over-the-counter Voltaren gel to deeply massage the injured tissue.  The patient was instructed to do this on a daily basis until symptoms resolve.  The patient was fitted with  Dynaflex inserts that will aid in offloading the tension forces to the soft tissues and prevent further inflammation.      The nature of the patient s toe condition was discussed at length.  I discussed with patient details of procedure(s), possible risks and complications, alternative treatment options and post-operative course detailed below.  Patient is aware that surgery is elective and can be avoided if desired.  Likely surgical procedures include a distal interphalangeal joint arthroplasty of the second toe on the right foot.  The patient was educated today about risks associated with surgery.  Risks of surgery include, but are not limited to: infection, painful scar, nerve injury, numbness, stiffness, over-correction, under-correction, non-union, need for repeat surgery, recurrence of condition, ongoing pain, delayed wound healing, blood clot in the legs or lungs, amputation or other unforeseen side effects from undergoing surgery.       The patient was instructed to not smoke or use nicotine patches before and after the surgery as this could result in poor outcomes due to slower healing potentially.  Risks of DVT/PE were discussed in relation to anticipated level of immobilization, inactivity, injury, surgery, medications and personal risk factors.  Perioperative education was provided regarding signs and symptoms of a blood clot.  The patient was encouraged to be vigilant regarding symptoms and pursue risk reduction measures.  Based on patient history, procedure and post-operative plan, risk outweighs benefit of chemical prophylaxis therefore mechanical prophylaxis  was encouraged.  Lower extremity range of motion exercises are encouraged.   Postoperative pain regimens were discussed in great detail the patient.  The risks and benefits of non-narcotic and narcotic pain medications, as well as synergistic agents was also discussed.  The patient will be prescribed Oxycodone for more severe breakthrough pain not relieved by scheduled Tylenol.  The patient was also encouraged to rest, elevate and ice postoperatively to help with swelling and pain control.  The patient was in agreement with this plan.  The patient understands that they would need to remain protected weightbearing with use of a postop shoe post-operatively.The recovery process was discussed including impact to work, walking, shoes and daily activities.  We discussed that the patient should anticipate up to 12 months for maximum recovery after surgery.  There is moderate risk involved.        After detailed discussed patient wishes to continue with the proposed surgical intervention.  The procedure will be performed  with a local block for anesthesia.   Consents will be reviewed and signed on the day of surgery.        Yumi verbalized agreement with and understanding of the rational for the diagnosis and treatment plan.  All questions were answered to best of my ability and the patient's satisfaction. The patient was advised to contact the clinic with any questions that may arise after the clinic visit.      Disclaimer: This note consists of symbols derived from keyboarding, dictation and/or voice recognition software. As a result, there may be errors in the script that have gone undetected. Please consider this when interpreting information found in this chart.       KORI De Luna D.P.M., F.A.C.F.A.S.        Again, thank you for allowing me to participate in the care of your patient.        Sincerely,        Richmond De Luna DPM

## 2023-07-21 ENCOUNTER — TELEPHONE (OUTPATIENT)
Dept: PODIATRY | Facility: CLINIC | Age: 63
End: 2023-07-21
Payer: MEDICARE

## 2023-08-18 ENCOUNTER — ANESTHESIA EVENT (OUTPATIENT)
Dept: SURGERY | Facility: CLINIC | Age: 63
End: 2023-08-18
Payer: MEDICARE

## 2023-08-18 ASSESSMENT — LIFESTYLE VARIABLES: TOBACCO_USE: 1

## 2023-08-18 NOTE — ANESTHESIA PREPROCEDURE EVALUATION
Anesthesia Pre-Procedure Evaluation    Patient: Yumi Huitron   MRN: [id number] : 1960        Procedure : Procedure(s):  Distal interphalangeal joint arthroplasty of the second toe, right foot          Past Medical History:   Diagnosis Date    Anemia     Arthritis     OA    Depression     GERD (gastroesophageal reflux disease)     Hypertension     Migraines       Past Surgical History:   Procedure Laterality Date    ARTHROSCOPY KNEE      ARTHROSCOPY SHOULDER ROTATOR CUFF REPAIR      CHOLECYSTECTOMY  2006    gallbladder removed    CHOLECYSTECTOMY      FOOT SURGERY      GYN SURGERY  2003    Supracervical hysterectomy and bilateral salpingectomy.    HYSTERECTOMY      ZZC TOTAL KNEE ARTHROPLASTY Left 2021    Procedure: LEFT TOTAL KNEE ARTHROPLASTY;  Surgeon: Nawaf Wilson MD;  Location: [facility];  Service: Orthopedics      Allergies   Allergen Reactions    Mold     No Clinical Screening - See Comments     Seasonale     Amoxicillin Rash     And blisters on hands      Social History     Tobacco Use    Smoking status: Former    Smokeless tobacco: Never   Substance Use Topics    Alcohol use: Not Currently     Comment: Moderate      Wt Readings from Last 1 Encounters:   23 90.7 kg (200 lb)        Anesthesia Evaluation   Pt has had prior anesthetic.     History of anesthetic complications       ROS/MED HX  ENT/Pulmonary:     (+) sleep apnea,    PETER risk factors,           tobacco use, Past use,                      Neurologic:     (+)      migraines,                          Cardiovascular:     (+)  hypertension- -   -  - -                                      METS/Exercise Tolerance:     Hematologic:     (+)      anemia,          Musculoskeletal:   (+)  arthritis,             GI/Hepatic:     (+) GERD,                   Renal/Genitourinary:       Endo:     (+)               Obesity,       Psychiatric/Substance Use:     (+) psychiatric history depression       Infectious Disease:        Malignancy:       Other:               OUTSIDE LABS:  CBC:   Lab Results   Component Value Date    WBC 5.1 04/22/2021    WBC 7.0 04/21/2021    HGB 9.7 (L) 04/22/2021    HGB 11.4 (L) 04/21/2021    HCT 30.1 (L) 04/22/2021    HCT 35.0 04/21/2021     04/22/2021     04/21/2021     BMP:   Lab Results   Component Value Date     04/22/2021    POTASSIUM 4.8 04/22/2021    POTASSIUM 4.3 04/21/2021    CHLORIDE 106 04/22/2021    CO2 28 04/22/2021    BUN 11 04/22/2021    CR 0.68 04/22/2021    CR 0.71 04/21/2021     04/22/2021     COAGS:   Lab Results   Component Value Date    PTT 23 12/22/2003    INR 1.04 04/21/2021     POC: No results found for: BGM, HCG, HCGS  HEPATIC:   Lab Results   Component Value Date    ALBUMIN 2.5 (L) 04/22/2021    PROTTOTAL 5.2 (L) 04/22/2021    ALT 22 04/22/2021    AST 19 04/22/2021    ALKPHOS 72 04/22/2021    BILITOTAL 0.1 04/22/2021     OTHER:   Lab Results   Component Value Date    JONO 7.5 (L) 04/22/2021    MAG 2.1 04/22/2021               Lena Rascon, CRNA, APRN CRNA

## 2023-08-29 ENCOUNTER — ANESTHESIA (OUTPATIENT)
Dept: SURGERY | Facility: CLINIC | Age: 63
End: 2023-08-29
Payer: MEDICARE

## 2023-08-29 ENCOUNTER — HOSPITAL ENCOUNTER (OUTPATIENT)
Facility: CLINIC | Age: 63
Discharge: HOME | End: 2023-08-29
Attending: PODIATRIST | Admitting: PODIATRIST
Payer: MEDICARE

## 2023-08-29 VITALS
BODY MASS INDEX: 35.33 KG/M2 | WEIGHT: 192 LBS | RESPIRATION RATE: 16 BRPM | SYSTOLIC BLOOD PRESSURE: 136 MMHG | OXYGEN SATURATION: 96 % | TEMPERATURE: 98 F | HEIGHT: 62 IN | DIASTOLIC BLOOD PRESSURE: 60 MMHG | HEART RATE: 63 BPM

## 2023-08-29 DIAGNOSIS — M20.5X1 MALLET TOE OF RIGHT FOOT: Primary | ICD-10-CM

## 2023-08-29 PROCEDURE — 360N000076 HC SURGERY LEVEL 3, PER MIN: Performed by: PODIATRIST

## 2023-08-29 PROCEDURE — 710N000012 HC RECOVERY PHASE 2, PER MINUTE: Performed by: PODIATRIST

## 2023-08-29 PROCEDURE — 250N000013 HC RX MED GY IP 250 OP 250 PS 637: Performed by: NURSE ANESTHETIST, CERTIFIED REGISTERED

## 2023-08-29 PROCEDURE — 999N000141 HC STATISTIC PRE-PROCEDURE NURSING ASSESSMENT: Performed by: PODIATRIST

## 2023-08-29 PROCEDURE — 272N000001 HC OR GENERAL SUPPLY STERILE: Performed by: PODIATRIST

## 2023-08-29 PROCEDURE — 28285 REPAIR OF HAMMERTOE: CPT | Mod: T6 | Performed by: PODIATRIST

## 2023-08-29 PROCEDURE — 250N000009 HC RX 250: Performed by: PODIATRIST

## 2023-08-29 PROCEDURE — 250N000011 HC RX IP 250 OP 636: Performed by: PODIATRIST

## 2023-08-29 PROCEDURE — 250N000009 HC RX 250: Performed by: NURSE ANESTHETIST, CERTIFIED REGISTERED

## 2023-08-29 PROCEDURE — 258N000003 HC RX IP 258 OP 636: Performed by: NURSE ANESTHETIST, CERTIFIED REGISTERED

## 2023-08-29 RX ORDER — CEFAZOLIN SODIUM/WATER 2 G/20 ML
2 SYRINGE (ML) INTRAVENOUS
Status: DISCONTINUED | OUTPATIENT
Start: 2023-08-29 | End: 2023-08-29 | Stop reason: HOSPADM

## 2023-08-29 RX ORDER — OXYCODONE HYDROCHLORIDE 5 MG/1
5 TABLET ORAL
Status: DISCONTINUED | OUTPATIENT
Start: 2023-08-29 | End: 2023-08-29 | Stop reason: HOSPADM

## 2023-08-29 RX ORDER — ACETAMINOPHEN 325 MG/1
975 TABLET ORAL ONCE
Status: COMPLETED | OUTPATIENT
Start: 2023-08-29 | End: 2023-08-29

## 2023-08-29 RX ORDER — LIDOCAINE HYDROCHLORIDE 10 MG/ML
INJECTION, SOLUTION INFILTRATION; PERINEURAL PRN
Status: DISCONTINUED | OUTPATIENT
Start: 2023-08-29 | End: 2023-08-29 | Stop reason: HOSPADM

## 2023-08-29 RX ORDER — GABAPENTIN 100 MG/1
100 CAPSULE ORAL
Status: COMPLETED | OUTPATIENT
Start: 2023-08-29 | End: 2023-08-29

## 2023-08-29 RX ORDER — ACETAMINOPHEN 325 MG/1
650 TABLET ORAL EVERY 4 HOURS PRN
Qty: 50 TABLET | Refills: 0 | Status: SHIPPED | OUTPATIENT
Start: 2023-08-29 | End: 2023-09-11

## 2023-08-29 RX ORDER — AMOXICILLIN 250 MG
1-2 CAPSULE ORAL 2 TIMES DAILY
Qty: 30 TABLET | Refills: 0 | Status: SHIPPED | OUTPATIENT
Start: 2023-08-29 | End: 2023-09-06

## 2023-08-29 RX ORDER — ONDANSETRON 4 MG/1
4 TABLET, ORALLY DISINTEGRATING ORAL
Status: DISCONTINUED | OUTPATIENT
Start: 2023-08-29 | End: 2023-08-29 | Stop reason: HOSPADM

## 2023-08-29 RX ORDER — CEFAZOLIN SODIUM/WATER 2 G/20 ML
2 SYRINGE (ML) INTRAVENOUS SEE ADMIN INSTRUCTIONS
Status: DISCONTINUED | OUTPATIENT
Start: 2023-08-29 | End: 2023-08-29 | Stop reason: HOSPADM

## 2023-08-29 RX ORDER — SODIUM CHLORIDE, SODIUM LACTATE, POTASSIUM CHLORIDE, CALCIUM CHLORIDE 600; 310; 30; 20 MG/100ML; MG/100ML; MG/100ML; MG/100ML
INJECTION, SOLUTION INTRAVENOUS CONTINUOUS
Status: DISCONTINUED | OUTPATIENT
Start: 2023-08-29 | End: 2023-08-29 | Stop reason: HOSPADM

## 2023-08-29 RX ORDER — OXYCODONE HYDROCHLORIDE 5 MG/1
5 TABLET ORAL EVERY 4 HOURS PRN
Qty: 16 TABLET | Refills: 0 | Status: SHIPPED | OUTPATIENT
Start: 2023-08-29 | End: 2023-09-11

## 2023-08-29 RX ORDER — BUPIVACAINE HYDROCHLORIDE 5 MG/ML
INJECTION, SOLUTION PERINEURAL PRN
Status: DISCONTINUED | OUTPATIENT
Start: 2023-08-29 | End: 2023-08-29 | Stop reason: HOSPADM

## 2023-08-29 RX ORDER — LIDOCAINE 40 MG/G
CREAM TOPICAL
Status: DISCONTINUED | OUTPATIENT
Start: 2023-08-29 | End: 2023-08-29 | Stop reason: HOSPADM

## 2023-08-29 RX ADMIN — GABAPENTIN 100 MG: 100 CAPSULE ORAL at 07:17

## 2023-08-29 RX ADMIN — SODIUM CHLORIDE, POTASSIUM CHLORIDE, SODIUM LACTATE AND CALCIUM CHLORIDE: 600; 310; 30; 20 INJECTION, SOLUTION INTRAVENOUS at 07:32

## 2023-08-29 RX ADMIN — LIDOCAINE HYDROCHLORIDE 0.1 ML: 10 INJECTION, SOLUTION EPIDURAL; INFILTRATION; INTRACAUDAL; PERINEURAL at 07:32

## 2023-08-29 RX ADMIN — ACETAMINOPHEN 975 MG: 325 TABLET, FILM COATED ORAL at 07:17

## 2023-08-29 ASSESSMENT — ACTIVITIES OF DAILY LIVING (ADL)
ADLS_ACUITY_SCORE: 35
ADLS_ACUITY_SCORE: 35

## 2023-08-29 NOTE — DISCHARGE INSTRUCTIONS
Same Day Surgery Discharge Instructions  Special Precautions After Surgery - Adult    It is not unusual to feel lightheaded or faint, up to 24 hours after surgery or while taking pain medication.  If you have these symptoms; sit for a few minutes before standing and have someone assist you when getting up.  You should rest and relax for the next 24 hours and must have someone stay with you for at least 24 hours after your discharge.  DO NOT DRIVE any vehicle or operate mechanical equipment for 24 hours following the end of your surgery.  DO NOT DRIVE while taking narcotic pain medications that have been prescribed by your physician.  If you had a limb operated on, you must be able to use it fully to drive.  DO NOT drink alcoholic beverages for 24 hours following surgery or while taking prescription pain medication.  Drink clear liquids (apple juice, ginger ale, broth, 7-Up, etc.).  Progress to your regular diet as you feel able.  Any questions call your physician and do not make important decisions for 24 hours.    Medications:  Acetaminophen (Tylenol):  Next dose: 1:00 pm.  Follow the instructions on the bottle.     __________________________________________________________________________________________________________________________________  IMPORTANT NUMBERS:    [facility] Main Number:  [phone], [phone]  Pharmacy:  [phone]  Same Day Surgery:  [phone], Monday - Friday until 8:30 p.m.  Urgent Care:  [phone]  Emergency Room:  [phone]                                                                               [facility]:  [phone] option 1  Nurse Advice Line: [phone]

## 2023-08-29 NOTE — OR NURSING
[facility] DISCHARGE NOTE    Patient discharged to home at 10:28 AM via wheel chair. Accompanied by spouse and staff. Discharge instructions reviewed with patient and spouse, opportunity offered to ask questions. Prescriptions sent to patients preferred pharmacy. All belongings sent with patient.    Elva Kay RN

## 2023-08-29 NOTE — BRIEF OP NOTE
[facility]    Brief Operative Note    Pre-operative diagnosis: Acquired mallet toe, right [M20.5X1]  Post-operative diagnosis Same as pre-operative diagnosis    Procedure: Procedure(s):  Distal interphalangeal joint arthroplasty of the second toe, right foot  Surgeon: Surgeon(s) and Role:     * Richmond De Luna DPM - Primary  Anesthesia: Local   Estimated Blood Loss: Less than 10 ml    Drains: None  Specimens: * No specimens in log *  Findings:   None.  Complications: None.  Implants: * No implants in log *

## 2023-08-29 NOTE — OP NOTE
PREOPERATIVE DIAGNOSIS:   1.  Mallet toe, second toe, right foot.     POSTOPERATIVE DIAGNOSIS:   1. Mallet toe, second toe, right foot.     PROCEDURE:   1. Distal interphalangeal joint arthroplasty mallet toe correction, second toe, right foot.     PATHOLOGY:   1. None.     ANESTHESIA: Monitored anesthesia care with local block to the right foot.     ESTIMATED BLOOD LOSS: Less than 10 mL     INDICATIONS FOR PROCEDURE: The patient has been seen and evaluated in clinic for the above-mentioned diagnoses.  They have failed to respond to nonoperative care measures and/or surgical care for condition was indicated.  They have elected to proceed as recommended/indicated with surgical care after a thorough discussion of the associated pros, cons, risks and benefits of the operations as well as the postoperative course and details.  All associated questions were answered.  Verbal and written form informed consent was obtained.  Please see additional information within the clinical notes.    PROCEDURE IN DETAIL: Patient was seen and evaluated in the preoperative holding area.  The surgical site was marked.  The consent was signed.  The H&P was updated/reviewed.  Patient was transported from the preoperative holding area to the surgical suite.  The patient was placed on the operative table.  Anesthesia was obtained and antibiotics were administered via IV. Tourniquet was applied to the right lower extremity.  The operative extremity was prepped and draped sterilely.  Then, a timeout was performed to identify the proper patient, surgical site and the procedures to be performed.  Local anesthetic was infiltrated about the operative margins for regional blockade utilizing a one-to-one mixture of 1% lidocaine plain and 0.5% Marcaine plain with approximately 6 cc of the mixture utilized.  A toe tourniquet was placed over the base of the second toe on the right foot.    Attention was directed to the dorsal aspect of the second digit  on the right  where an eliptical incision was made over the dorsal aspect of the second distal interphalangeal joint on the right. The incision was carried full thickness down to subcutaneous tissue utilizing sharp and blunt dissection. Care was taken to identify and retract all vital neurovascular structures. All active bleeders were ligated and cauterized as necessary. Next, a capsulotomy tenotomy was performed, distal interphalangeal joint 2nd digit. The head of the middle phalanx was resected utilizing a sagittal saw. The wound was irrigated with copious amounts of normal sterile saline.  The flexor digitorum longus tendon was then transected through the dorsal incision.  Layered, anatomic closure completed with  4-0 nylon suture with careful apposition of the underlying  skin for primary healing.  The toe tourniquet was removed from the second toe.  The wound was dressed with sterile bacitracin Xeroform dressing, 4 x 4s, ABD pad, cast padding and an Ace wrap.     COMPLICATIONS: No direct complications encountered throughout the case.    The patient tolerated the procedure & anesthesia well.  They were transported from the operative suite to the postoperative holding area.  The patient was given postoperative orders as well as specific postoperative instructions which were reviewed by the nursing staff.  Orders were placed for weightbearing status/activity, postoperative oral pain management, DVT prophylaxis measures both with mechanical and medicinal measures reviewed.  Splint/dressing care measures were reviewed as well as appropriate cryotherapy measures and nutrition.  Postoperative follow-up to be conducted in the next 6 - 10 days for outpatient clinical follow-up in the Orthopedic clinic at [facility] and [facility] in [facility].  If concerns or questions arise or develop they will contact our clinic and postoperative contact numbers provided.  Case details and post-operative care  requirements reviewed with family/support present today.  Additionally, a detailed postoperative instruction sheet was provided to the patient and family.  All additional questions were answered postoperatively.     Post Operative Plan:    1. Activity: protected weightbearing on the surgical foot/ankle  2. Assistive Devices: Crutches and/or knee scooter arranged  3. Pain Management: IV + PO pain management  4. Dressing Care: Leaving dressing clean, dry and intact until post op appointment.  Clinical contact direct information provided.  5. DVT prevention: Knee exercises and ankle pump exercises reviewed.    6. Infection prevention: Pre-op IV antibiotics completed.  7. Disposition: Able to discharge to home with observation by patient's family or friend for 12 hours.  8. Clinical Follow-up: As arranged from clinic in 6 - 10 days post op.      TIMOTHY DE LUNA DPM, FACFAS

## 2023-08-30 NOTE — ANESTHESIA POSTPROCEDURE EVALUATION
Patient: Yumi Huitron    Procedure: Procedure(s):  Distal interphalangeal joint arthroplasty of the second toe, right foot       Anesthesia Type:  No value filed.    Note:  Disposition: Outpatient   Postop Pain Control: Uneventful            Sign Out: Well controlled pain   PONV: No   Neuro/Psych: Uneventful            Sign Out: Acceptable/Baseline neuro status   Airway/Respiratory: Uneventful            Sign Out: Acceptable/Baseline resp. status   CV/Hemodynamics: Uneventful            Sign Out: Acceptable CV status; No obvious hypovolemia; No obvious fluid overload   Other NRE: NONE   DID A NON-ROUTINE EVENT OCCUR? No           Last vitals:  Vitals Value Taken Time   /60 08/29/23 1015   Temp 36.7  C (98  F) 08/29/23 1015   Pulse 63 08/29/23 0938   Resp 16 08/29/23 1015   SpO2 98 % 08/29/23 1021   Vitals shown include unvalidated device data.    Electronically Signed By: Lena Rascon CRNA, AMRIT SEGUNDO  August 29, 2023  9:50 PM

## 2023-09-06 ENCOUNTER — OFFICE VISIT (OUTPATIENT)
Dept: PODIATRY | Facility: CLINIC | Age: 63
End: 2023-09-06
Payer: MEDICARE

## 2023-09-06 ENCOUNTER — ANCILLARY PROCEDURE (OUTPATIENT)
Dept: GENERAL RADIOLOGY | Facility: CLINIC | Age: 63
End: 2023-09-06
Attending: PODIATRIST
Payer: MEDICARE

## 2023-09-06 VITALS
DIASTOLIC BLOOD PRESSURE: 77 MMHG | HEART RATE: 69 BPM | SYSTOLIC BLOOD PRESSURE: 122 MMHG | WEIGHT: 192 LBS | HEIGHT: 62 IN | BODY MASS INDEX: 35.33 KG/M2

## 2023-09-06 DIAGNOSIS — M20.5X1 ACQUIRED MALLET TOE, RIGHT: Primary | ICD-10-CM

## 2023-09-06 DIAGNOSIS — Z98.890 STATUS POST RIGHT FOOT SURGERY: ICD-10-CM

## 2023-09-06 PROCEDURE — 73630 X-RAY EXAM OF FOOT: CPT | Mod: TC | Performed by: RADIOLOGY

## 2023-09-06 PROCEDURE — 99024 POSTOP FOLLOW-UP VISIT: CPT | Performed by: PODIATRIST

## 2023-09-06 NOTE — PROGRESS NOTES
Yumi presents to the office s/p one week mallet toe correction of the right foot .  The patient relates keeping the bandages clean, dry and intact.  The patient relates good compliance with postoperative instructions.  The patient denies any severe pain, fevers, chills, nausea or vomiting.  The patient denies having any calf swelling or tenderness.    There were no vitals taken for this visit.       Physical Exam:    The patient appears to be in no distress and in good spirits.  The bandages appear clean, dry and intact with no strikethrough noted.   Neurovascular status unchanged with < 3 sec capillary refill to all digits.  No evidence of allodynia.  Noted mild to moderate edema to the operative site on the right foot.  Sutures are intact and the skin is well coapted with no erythema or drainage noted.  No pain on palpation, erythema or noted calor over the back of the calf.    Radiograph:  AP, lateral and medial oblique evaluation of right foot reveals surgical correction of the mallet toe deformity of the second toe.       Assessment:     ICD-10-CM    1. Acquired mallet toe, right  M20.5X1       2. Status post right foot surgery  Z98.890           Plan:  Sutures remain intact.  A sterile dressing was reapplied.  The patient was instructed to continue  protected weightbearing to the right foot.  The patient is to keep the dressings clean, dry and intact and to continue with elevation of the right foot.  To decrease the risk of developing a blood clot, the patient was instructed to continue with performing leg lifts and knee bends throughout the day to help keep blood moving.  The patient was instructed that if they notice any calf pain, swelling, or shortness of breath, they should to the nearest ER or Urgent Care to be evaluated for a blood clot.  The patient will return to the office in one week for suture removal.      Yumi verbalized agreement with and understanding of the rational for the diagnosis and  treatment plan.  All questions were answered to best of my ability and the patient's satisfaction. The patient was advised to contact the clinic with any questions that may arise after the clinic visit.      Disclaimer: This note consists of symbols derived from keyboarding, dictation and/or voice recognition software. As a result, there may be errors in the script that have gone undetected. Please consider this when interpreting information found in this chart.       KORI De Luna D.P.M., F.ALEX.C.F.A.S.

## 2023-09-06 NOTE — NURSING NOTE
"Chief Complaint   Patient presents with    Surgical Followup     Right foot- no concerns        Initial /77   Pulse 69   Ht 1.575 m (5' 2\")   Wt 87.1 kg (192 lb)   BMI 35.12 kg/m   Estimated body mass index is 35.12 kg/m  as calculated from the following:    Height as of this encounter: 1.575 m (5' 2\").    Weight as of this encounter: 87.1 kg (192 lb).  Medications and allergies reviewed.      Megan DUTTA MA    "

## 2023-09-06 NOTE — LETTER
9/6/2023         RE: Yumi Huitron  [id number]  [facility] [address]        Dear Colleague,    Thank you for referring your patient, Yumi Huitron, to the [facility]. Please see a copy of my visit note below.    Yumi presents to the office s/p one week mallet toe correction of the right foot .  The patient relates keeping the bandages clean, dry and intact.  The patient relates good compliance with postoperative instructions.  The patient denies any severe pain, fevers, chills, nausea or vomiting.  The patient denies having any calf swelling or tenderness.    There were no vitals taken for this visit.       Physical Exam:    The patient appears to be in no distress and in good spirits.  The bandages appear clean, dry and intact with no strikethrough noted.   Neurovascular status unchanged with < 3 sec capillary refill to all digits.  No evidence of allodynia.  Noted mild to moderate edema to the operative site on the right foot.  Sutures are intact and the skin is well coapted with no erythema or drainage noted.  No pain on palpation, erythema or noted calor over the back of the calf.    Radiograph:  AP, lateral and medial oblique evaluation of right foot reveals surgical correction of the mallet toe deformity of the second toe.       Assessment:     ICD-10-CM    1. Acquired mallet toe, right  M20.5X1       2. Status post right foot surgery  Z98.890           Plan:  Sutures remain intact.  A sterile dressing was reapplied.  The patient was instructed to continue  protected weightbearing to the right foot.  The patient is to keep the dressings clean, dry and intact and to continue with elevation of the right foot.  To decrease the risk of developing a blood clot, the patient was instructed to continue with performing leg lifts and knee bends throughout the day to help keep blood moving.  The patient was instructed that if they notice any calf pain, swelling, or  shortness of breath, they should to the nearest ER or Urgent Care to be evaluated for a blood clot.  The patient will return to the office in one week for suture removal.      Yumi verbalized agreement with and understanding of the rational for the diagnosis and treatment plan.  All questions were answered to best of my ability and the patient's satisfaction. The patient was advised to contact the clinic with any questions that may arise after the clinic visit.      Disclaimer: This note consists of symbols derived from keyboarding, dictation and/or voice recognition software. As a result, there may be errors in the script that have gone undetected. Please consider this when interpreting information found in this chart.       TRA Vazquez.P.M., F.A.C.F.A.S.      Again, thank you for allowing me to participate in the care of your patient.        Sincerely,        Richmond De Luna DPM

## 2023-09-10 ENCOUNTER — HEALTH MAINTENANCE LETTER (OUTPATIENT)
Age: 63
End: 2023-09-10

## 2023-09-11 ENCOUNTER — OFFICE VISIT (OUTPATIENT)
Dept: PODIATRY | Facility: CLINIC | Age: 63
End: 2023-09-11
Payer: MEDICARE

## 2023-09-11 VITALS
SYSTOLIC BLOOD PRESSURE: 122 MMHG | DIASTOLIC BLOOD PRESSURE: 77 MMHG | BODY MASS INDEX: 35.33 KG/M2 | HEIGHT: 62 IN | WEIGHT: 192 LBS | HEART RATE: 65 BPM

## 2023-09-11 DIAGNOSIS — Z98.890 STATUS POST RIGHT FOOT SURGERY: ICD-10-CM

## 2023-09-11 DIAGNOSIS — M20.5X1 ACQUIRED MALLET TOE, RIGHT: Primary | ICD-10-CM

## 2023-09-11 PROCEDURE — 99024 POSTOP FOLLOW-UP VISIT: CPT | Performed by: PODIATRIST

## 2023-09-11 RX ORDER — IBUPROFEN 600 MG/1
600 TABLET, FILM COATED ORAL EVERY 6 HOURS PRN
COMMUNITY
Start: 2023-07-24

## 2023-09-11 RX ORDER — LISINOPRIL 10 MG/1
10 TABLET ORAL DAILY
COMMUNITY
Start: 2023-09-07

## 2023-09-11 RX ORDER — TOPIRAMATE 25 MG/1
1 TABLET, FILM COATED ORAL AT BEDTIME
COMMUNITY
Start: 2023-08-18

## 2023-09-11 RX ORDER — TORSEMIDE 10 MG/1
1 TABLET ORAL DAILY
COMMUNITY
Start: 2023-02-28

## 2023-09-11 RX ORDER — FLUTICASONE PROPIONATE 50 MCG
1 SPRAY, SUSPENSION (ML) NASAL DAILY PRN
COMMUNITY
Start: 2023-06-24

## 2023-09-11 RX ORDER — FLUOXETINE 40 MG/1
1 CAPSULE ORAL EVERY MORNING
COMMUNITY
Start: 2023-02-28

## 2023-09-11 RX ORDER — TRAZODONE HYDROCHLORIDE 100 MG/1
100 TABLET ORAL AT BEDTIME
COMMUNITY
Start: 2023-08-07

## 2023-09-11 RX ORDER — OMEPRAZOLE 40 MG/1
40 CAPSULE, DELAYED RELEASE ORAL
COMMUNITY

## 2023-09-11 RX ORDER — TRIAMCINOLONE ACETONIDE 1 MG/G
OINTMENT TOPICAL 2 TIMES DAILY
COMMUNITY
Start: 2023-08-18

## 2023-09-11 NOTE — PROGRESS NOTES
"Yumi presents to the office s/p two weeks mallet toe correction of the right foot .  The patient relates keeping the bandages clean, dry and intact.  The patient relates good compliance with postoperative instructions.  The patient denies any severe pain, fevers, chills, nausea or vomiting.  The patient denies having any calf swelling or tenderness.    /77   Pulse 65   Ht 1.575 m (5' 2\")   Wt 87.1 kg (192 lb)   BMI 35.12 kg/m         Physical Exam:    The patient appears to be in no distress and in good spirits.  The bandages appear clean, dry and intact with no strikethrough noted.   Neurovascular status unchanged with < 3 sec capillary refill to all digits.  No evidence of allodynia.  Noted mild to moderate edema to the operative site on the right foot.  Sutures are intact and the skin is well coapted with no erythema or drainage noted.  No pain on palpation, erythema or noted calor over the back of the calf.         Assessment:     ICD-10-CM    1. Acquired mallet toe, right  M20.5X1       2. Status post right foot surgery  Z98.890           Plan:  Sutures were removed and bandage applied.  The patient may resume normal activities in supportive shoes. The patient may return in any problems arise.    Yumi verbalized agreement with and understanding of the rational for the diagnosis and treatment plan.  All questions were answered to best of my ability and the patient's satisfaction. The patient was advised to contact the clinic with any questions that may arise after the clinic visit.      Disclaimer: This note consists of symbols derived from keyboarding, dictation and/or voice recognition software. As a result, there may be errors in the script that have gone undetected. Please consider this when interpreting information found in this chart.       KORI De Luna D.P.M., FCECILIA.C.F.A.S.  "

## 2023-09-11 NOTE — LETTER
"    9/11/2023         RE: Yumi Huitron  [address] [facility] [id number]        Dear Colleague,    Thank you for referring your patient, Yumi Huitron, to the [facility]. Please see a copy of my visit note below.    Yumi presents to the office s/p two weeks mallet toe correction of the right foot .  The patient relates keeping the bandages clean, dry and intact.  The patient relates good compliance with postoperative instructions.  The patient denies any severe pain, fevers, chills, nausea or vomiting.  The patient denies having any calf swelling or tenderness.    /77   Pulse 65   Ht 1.575 m (5' 2\")   Wt 87.1 kg (192 lb)   BMI 35.12 kg/m         Physical Exam:    The patient appears to be in no distress and in good spirits.  The bandages appear clean, dry and intact with no strikethrough noted.   Neurovascular status unchanged with < 3 sec capillary refill to all digits.  No evidence of allodynia.  Noted mild to moderate edema to the operative site on the right foot.  Sutures are intact and the skin is well coapted with no erythema or drainage noted.  No pain on palpation, erythema or noted calor over the back of the calf.         Assessment:     ICD-10-CM    1. Acquired mallet toe, right  M20.5X1       2. Status post right foot surgery  Z98.890           Plan:  Sutures were removed and bandage applied.  The patient may resume normal activities in supportive shoes. The patient may return in any problems arise.    Yumi verbalized agreement with and understanding of the rational for the diagnosis and treatment plan.  All questions were answered to best of my ability and the patient's satisfaction. The patient was advised to contact the clinic with any questions that may arise after the clinic visit.      Disclaimer: This note consists of symbols derived from keyboarding, dictation and/or voice recognition software. As a result, there may be errors in " the script that have gone undetected. Please consider this when interpreting information found in this chart.       KORI De Luna D.P.M., F.A.C.F.A.S.      Again, thank you for allowing me to participate in the care of your patient.        Sincerely,        Richmond De Luna DPM

## 2023-09-11 NOTE — NURSING NOTE
"Chief Complaint   Patient presents with    Suture Removal     Right foot- no concerns       Initial /77   Pulse 65   Ht 1.575 m (5' 2\")   Wt 87.1 kg (192 lb)   BMI 35.12 kg/m   Estimated body mass index is 35.12 kg/m  as calculated from the following:    Height as of this encounter: 1.575 m (5' 2\").    Weight as of this encounter: 87.1 kg (192 lb).  Medications and allergies reviewed.      Megan DUTTA MA    "

## 2024-04-07 ENCOUNTER — HEALTH MAINTENANCE LETTER (OUTPATIENT)
Age: 64
End: 2024-04-07

## 2025-03-01 ENCOUNTER — HEALTH MAINTENANCE LETTER (OUTPATIENT)
Age: 65
End: 2025-03-01

## 2025-04-19 ENCOUNTER — HEALTH MAINTENANCE LETTER (OUTPATIENT)
Age: 65
End: 2025-04-19

## (undated) DEVICE — DECANTER VIAL 2006S

## (undated) DEVICE — PACK EXTREMITY LATEX FREE SOP32HFFCS

## (undated) DEVICE — DRAPE EXTREMITY W/ARMBOARD 29405

## (undated) DEVICE — BLADE SAW OSCIL/SAG STRK MICRO 5.5X18X0.38MM 2296-003-412

## (undated) DEVICE — DRSG XEROFORM 1X8"

## (undated) DEVICE — NDL 18GA 1.5" 305196

## (undated) DEVICE — GOWN IMPERVIOUS SPECIALTY XLG/XLONG 32474

## (undated) DEVICE — SUCTION MANIFOLD NEPTUNE 2 SYS 1 PORT 702-025-000

## (undated) DEVICE — BNDG ELASTIC 4"X5YDS UNSTERILE 6611-40

## (undated) DEVICE — SYR 10ML LL W/O NDL

## (undated) DEVICE — DRAPE SHEET REV FOLD 3/4 9349

## (undated) DEVICE — CAST PADDING 4" STERILE 9044S

## (undated) DEVICE — DRAPE STOCKINETTE IMPERVIOUS 12" 1587

## (undated) DEVICE — CUFF TOURN 18IN STRL DISP

## (undated) DEVICE — GLOVE BIOGEL PI MICRO SZ 8.0 48580

## (undated) DEVICE — DRSG ABDOMINAL 07 1/2X8" 7197D

## (undated) DEVICE — SOL WATER IRRIG 1000ML BOTTLE 07139-09

## (undated) DEVICE — SOL NACL 0.9% IRRIG 1000ML BOTTLE 07138-09

## (undated) DEVICE — SU ETHILON 4-0 PS-2 18" 1667G

## (undated) DEVICE — PREP CHLORAPREP 26ML TINTED ORANGE  260815

## (undated) DEVICE — NDL 25GA 1.5" 305127

## (undated) DEVICE — DRSG GAUZE 4X4" TRAY

## (undated) DEVICE — GLOVE BIOGEL PI MICRO INDICATOR UNDERGLOVE SZ 8.0 48980

## (undated) RX ORDER — CEFAZOLIN SODIUM/WATER 2 G/20 ML
SYRINGE (ML) INTRAVENOUS
Status: DISPENSED
Start: 2023-08-29

## (undated) RX ORDER — ACETAMINOPHEN 325 MG/1
TABLET ORAL
Status: DISPENSED
Start: 2023-08-29

## (undated) RX ORDER — GABAPENTIN 100 MG/1
CAPSULE ORAL
Status: DISPENSED
Start: 2023-08-29